# Patient Record
Sex: MALE | Race: WHITE | NOT HISPANIC OR LATINO | URBAN - METROPOLITAN AREA
[De-identification: names, ages, dates, MRNs, and addresses within clinical notes are randomized per-mention and may not be internally consistent; named-entity substitution may affect disease eponyms.]

---

## 2019-11-01 ENCOUNTER — OUTPATIENT (OUTPATIENT)
Dept: OUTPATIENT SERVICES | Facility: HOSPITAL | Age: 74
LOS: 1 days | End: 2019-11-01
Payer: MEDICARE

## 2019-11-01 PROCEDURE — G9001: CPT

## 2019-11-12 ENCOUNTER — EMERGENCY (EMERGENCY)
Facility: HOSPITAL | Age: 74
LOS: 1 days | Discharge: HOME | End: 2019-11-12
Attending: EMERGENCY MEDICINE
Payer: MEDICARE

## 2019-11-12 VITALS
TEMPERATURE: 98 F | RESPIRATION RATE: 18 BRPM | HEIGHT: 67 IN | SYSTOLIC BLOOD PRESSURE: 161 MMHG | DIASTOLIC BLOOD PRESSURE: 81 MMHG | WEIGHT: 235.01 LBS | HEART RATE: 90 BPM | OXYGEN SATURATION: 98 %

## 2019-11-12 VITALS
DIASTOLIC BLOOD PRESSURE: 82 MMHG | OXYGEN SATURATION: 100 % | SYSTOLIC BLOOD PRESSURE: 147 MMHG | HEART RATE: 71 BPM | TEMPERATURE: 98 F | RESPIRATION RATE: 17 BRPM

## 2019-11-12 PROCEDURE — 99284 EMERGENCY DEPT VISIT MOD MDM: CPT | Mod: 25

## 2019-11-12 PROCEDURE — 70450 CT HEAD/BRAIN W/O DYE: CPT | Mod: 26

## 2019-11-12 PROCEDURE — 70450 CT HEAD/BRAIN W/O DYE: CPT

## 2019-11-12 PROCEDURE — 90471 IMMUNIZATION ADMIN: CPT

## 2019-11-12 PROCEDURE — 99284 EMERGENCY DEPT VISIT MOD MDM: CPT

## 2019-11-12 PROCEDURE — 90715 TDAP VACCINE 7 YRS/> IM: CPT

## 2019-11-12 PROCEDURE — 70486 CT MAXILLOFACIAL W/O DYE: CPT

## 2019-11-12 PROCEDURE — 70486 CT MAXILLOFACIAL W/O DYE: CPT | Mod: 26

## 2019-11-12 RX ORDER — TETANUS TOXOID, REDUCED DIPHTHERIA TOXOID AND ACELLULAR PERTUSSIS VACCINE, ADSORBED 5; 2.5; 8; 8; 2.5 [IU]/.5ML; [IU]/.5ML; UG/.5ML; UG/.5ML; UG/.5ML
0.5 SUSPENSION INTRAMUSCULAR ONCE
Refills: 0 | Status: COMPLETED | OUTPATIENT
Start: 2019-11-12 | End: 2019-11-12

## 2019-11-12 RX ADMIN — TETANUS TOXOID, REDUCED DIPHTHERIA TOXOID AND ACELLULAR PERTUSSIS VACCINE, ADSORBED 0.5 MILLILITER(S): 5; 2.5; 8; 8; 2.5 SUSPENSION INTRAMUSCULAR at 16:13

## 2019-11-12 NOTE — ED PROVIDER NOTE - PROGRESS NOTE DETAILS
CT shows scalp hematoma and nasal bone fracture. No septal hematoma. Patient well appearing. Denies any symptoms.. Discussed results and return precautions. Will need to follow up with E.N.T in 1 week. Will need to follow up with PMD in 1-2 days. Pt is well appearing walking with steady gait, stable for discharge and follow up without fail with medical doctor. I had a detailed discussion with the patient and/or guardian regarding the historical points, exam findings, and any diagnostic results supporting the discharge diagnosis. Pt educated on care and need for follow up. Strict return instructions and red flag signs and symptoms discussed with patient. Questions answered. Pt shows understanding of discharge information and agrees to follow.

## 2019-11-12 NOTE — ED PROVIDER NOTE - PHYSICAL EXAMINATION
Abrasion to the mid forearm  Diffused edema to the base of nose with a superficial laceration  No crepitus  Mild tenderness to the base of the nose  Nasal septum midline  No septal hematoma  Dried blood in b/l nostrils  No other facial or lynette tenderness  No C-spine tenderness  Mild abrasion to the left knee with FROM

## 2019-11-12 NOTE — ED PROVIDER NOTE - CARE PLAN
Principal Discharge DX:	Closed fracture of nasal bone, initial encounter  Secondary Diagnosis:	Injury of head, initial encounter  Secondary Diagnosis:	Abrasion

## 2019-11-12 NOTE — ED PROVIDER NOTE - CLINICAL SUMMARY MEDICAL DECISION MAKING FREE TEXT BOX
74 with mechanical fall and abrasion to the forehead and nose. No other injuries. Obtain CT head, maxillofacial, update tetanus, likely discharge if workup negative.

## 2019-11-12 NOTE — ED PROVIDER NOTE - PATIENT PORTAL LINK FT
You can access the FollowMyHealth Patient Portal offered by [facility] by registering at the following website: [email or web address]. By joining [facility]s FollowMyHealth portal, you will also be able to view your health information using other applications (apps) compatible with our system.

## 2019-11-12 NOTE — ED PROVIDER NOTE - OBJECTIVE STATEMENT
75 y/o M patient with a significant PMHx of Hypercholesterolemia, HTN, Prostate CA, and a significant PSHx of prostatectomy presents to the ED s/p fall. Patient states it felt very hot in his house from the heater and he thinks he tripped and hit his face. Patient denies LOC, chest pain, SOB, and any other complaints. Patient denies any pain or injury. NKDA.

## 2019-11-12 NOTE — ED ADULT NURSE NOTE - CADM POA URETHRAL CATHETER
Home care instructions  Apply ice pack to the injection site for 20 minutes periods for the first 24 hrs for soreness/discomfort at injection site DO NOT USE HEAT FOR 24 HOURS  Keep site clean and dry for 24 hours, remove bandaid when desired  Do not drive until tomorrow  Take care when walking after a lumbar injection  Avoid strenuous activities for 2 days  Make take 2 weeks to feel the full effects   Resume home medication as prescribed today  Resume Aspirin, Plavix, or Coumadin the day after the procedure unless otherwise instructed.    SEE IMMEDIATE MEDICAL HELP FOR:  Severe increase in your usual pain or appearance of new pain  Prolonged or increasing weakness or numbness in the legs or arms  Drainage, redness, active bleeding, or increased swelling at the injection site  Temperature over 100.0 degrees F.  Headache that increases when your head is upright and decreases when you lie flat    CALL 911 OR GO DIRECTLY TO EMERGENCY DEPARTMENT FOR:  Shortness of breath, chest pain, or problems breathing  
No

## 2019-11-12 NOTE — ED ADULT NURSE NOTE - OBJECTIVE STATEMENT
Pt. c/o superficial laceration to the forehead and nose s/p fall. Pt. stated he felt hot in the house because of the heat and "I think I tripped and fell and hit my face".

## 2019-11-18 DIAGNOSIS — Z71.89 OTHER SPECIFIED COUNSELING: ICD-10-CM

## 2020-08-18 NOTE — ED PROVIDER NOTE - SCRIBE NAME
ORTHOPAEDICS DAILY PROGRESS NOTE:       SUBJECTIVE/ROS: No acute events overnight. Patient seen and examined this AM at the bedside. Patient feels well. Denies nausea, vomiting, chest pain, shortness of breath         MEDICATIONS  (STANDING):  aspirin enteric coated 325 milliGRAM(s) Oral two times a day  carbidopa/levodopa  25/100 1 Tablet(s) Oral <User Schedule>  escitalopram 10 milliGRAM(s) Oral daily  polyethylene glycol 3350 17 Gram(s) Oral daily  senna 2 Tablet(s) Oral at bedtime    MEDICATIONS  (PRN):  ondansetron Injectable 4 milliGRAM(s) IV Push every 6 hours PRN Nausea and/or Vomiting  oxyCODONE    IR 2.5 milliGRAM(s) Oral every 4 hours PRN Moderate Pain (4 - 6)  oxyCODONE    IR 5 milliGRAM(s) Oral every 4 hours PRN Severe Pain (7 - 10)      OBJECTIVE:    Vital Signs Last 24 Hrs  T(C): 36.6 (18 Aug 2020 04:40), Max: 36.7 (17 Aug 2020 21:27)  T(F): 97.8 (18 Aug 2020 04:40), Max: 98.1 (17 Aug 2020 21:27)  HR: 76 (18 Aug 2020 04:40) (71 - 86)  BP: 129/73 (18 Aug 2020 04:40) (93/55 - 129/73)  BP(mean): --  RR: 18 (18 Aug 2020 04:40) (18 - 18)  SpO2: 92% (18 Aug 2020 04:40) (92% - 94%)    I&O's Detail    17 Aug 2020 07:01  -  18 Aug 2020 07:00  --------------------------------------------------------  IN:    Oral Fluid: 1910 mL  Total IN: 1910 mL    OUT:    Voided: 1900 mL  Total OUT: 1900 mL    Total NET: 10 mL          Daily     Daily     LABS:                        10.2   7.41  )-----------( 184      ( 18 Aug 2020 06:22 )             32.1     08-18    142  |  104  |  18  ----------------------------<  95  4.4   |  29  |  0.71    Ca    8.6      18 Aug 2020 06:22                    PHYSICAL EXAM:  Constitutional: well developed, well nourished, NAD  Eyes: anicteric  ENMT: normal facies, symmetric  Neck: supple  Respiratory: nonlabored resp  MSK:  RLE:  dressing clean, dry, intact, aquacell  SILT S/S/DP/SP/T  +EHL/FHL/TA/GSC  Compartments soft/non-tender  Toes warm, Cap refill brisk/warm and perfused, +DP/PT pulse     Orlando Wells MD Melita Castillo (Scribe)

## 2021-11-05 ENCOUNTER — TRANSCRIPTION ENCOUNTER (OUTPATIENT)
Age: 76
End: 2021-11-05

## 2024-02-04 ENCOUNTER — INPATIENT (INPATIENT)
Facility: HOSPITAL | Age: 79
LOS: 2 days | Discharge: HOME | DRG: 871 | End: 2024-02-07
Attending: INTERNAL MEDICINE | Admitting: INTERNAL MEDICINE
Payer: MEDICARE

## 2024-02-04 VITALS
TEMPERATURE: 97 F | OXYGEN SATURATION: 91 % | RESPIRATION RATE: 20 BRPM | WEIGHT: 220.24 LBS | DIASTOLIC BLOOD PRESSURE: 61 MMHG | SYSTOLIC BLOOD PRESSURE: 120 MMHG | HEIGHT: 68 IN | HEART RATE: 101 BPM

## 2024-02-04 DIAGNOSIS — A41.9 SEPSIS, UNSPECIFIED ORGANISM: ICD-10-CM

## 2024-02-04 DIAGNOSIS — Z29.9 ENCOUNTER FOR PROPHYLACTIC MEASURES, UNSPECIFIED: ICD-10-CM

## 2024-02-04 DIAGNOSIS — E11.9 TYPE 2 DIABETES MELLITUS WITHOUT COMPLICATIONS: ICD-10-CM

## 2024-02-04 DIAGNOSIS — J18.9 PNEUMONIA, UNSPECIFIED ORGANISM: ICD-10-CM

## 2024-02-04 DIAGNOSIS — J10.1 INFLUENZA DUE TO OTHER IDENTIFIED INFLUENZA VIRUS WITH OTHER RESPIRATORY MANIFESTATIONS: ICD-10-CM

## 2024-02-04 DIAGNOSIS — J96.01 ACUTE RESPIRATORY FAILURE WITH HYPOXIA: ICD-10-CM

## 2024-02-04 DIAGNOSIS — E78.5 HYPERLIPIDEMIA, UNSPECIFIED: ICD-10-CM

## 2024-02-04 DIAGNOSIS — I10 ESSENTIAL (PRIMARY) HYPERTENSION: ICD-10-CM

## 2024-02-04 LAB
ALBUMIN SERPL ELPH-MCNC: 3.6 G/DL — SIGNIFICANT CHANGE UP (ref 3.5–5)
ALP SERPL-CCNC: 56 U/L — SIGNIFICANT CHANGE UP (ref 40–120)
ALT FLD-CCNC: 26 U/L DA — SIGNIFICANT CHANGE UP (ref 10–60)
ANION GAP SERPL CALC-SCNC: 9 MMOL/L — SIGNIFICANT CHANGE UP (ref 5–17)
APPEARANCE UR: CLEAR — SIGNIFICANT CHANGE UP
APTT BLD: 35.4 SEC — SIGNIFICANT CHANGE UP (ref 24.5–35.6)
AST SERPL-CCNC: 22 U/L — SIGNIFICANT CHANGE UP (ref 10–40)
BASOPHILS # BLD AUTO: 0 K/UL — SIGNIFICANT CHANGE UP (ref 0–0.2)
BASOPHILS NFR BLD AUTO: 0 % — SIGNIFICANT CHANGE UP (ref 0–2)
BILIRUB SERPL-MCNC: 1.2 MG/DL — SIGNIFICANT CHANGE UP (ref 0.2–1.2)
BILIRUB UR-MCNC: NEGATIVE — SIGNIFICANT CHANGE UP
BUN SERPL-MCNC: 17 MG/DL — SIGNIFICANT CHANGE UP (ref 7–18)
CALCIUM SERPL-MCNC: 8.4 MG/DL — SIGNIFICANT CHANGE UP (ref 8.4–10.5)
CHLORIDE SERPL-SCNC: 97 MMOL/L — SIGNIFICANT CHANGE UP (ref 96–108)
CHOLEST SERPL-MCNC: 99 MG/DL — SIGNIFICANT CHANGE UP
CO2 SERPL-SCNC: 28 MMOL/L — SIGNIFICANT CHANGE UP (ref 22–31)
COLOR SPEC: YELLOW — SIGNIFICANT CHANGE UP
CREAT SERPL-MCNC: 1.17 MG/DL — SIGNIFICANT CHANGE UP (ref 0.5–1.3)
DIFF PNL FLD: ABNORMAL
EGFR: 63 ML/MIN/1.73M2 — SIGNIFICANT CHANGE UP
EOSINOPHIL # BLD AUTO: 0 K/UL — SIGNIFICANT CHANGE UP (ref 0–0.5)
EOSINOPHIL NFR BLD AUTO: 0 % — SIGNIFICANT CHANGE UP (ref 0–6)
ETHANOL SERPL-MCNC: 5 MG/DL — SIGNIFICANT CHANGE UP (ref 0–10)
FLUAV AG NPH QL: DETECTED
FLUBV AG NPH QL: SIGNIFICANT CHANGE UP
GAS PNL BLDA: SIGNIFICANT CHANGE UP
GLUCOSE BLDC GLUCOMTR-MCNC: 126 MG/DL — HIGH (ref 70–99)
GLUCOSE BLDC GLUCOMTR-MCNC: 127 MG/DL — HIGH (ref 70–99)
GLUCOSE SERPL-MCNC: 151 MG/DL — HIGH (ref 70–99)
GLUCOSE UR QL: NEGATIVE MG/DL — SIGNIFICANT CHANGE UP
HCT VFR BLD CALC: 40.6 % — SIGNIFICANT CHANGE UP (ref 39–50)
HDLC SERPL-MCNC: 46 MG/DL — SIGNIFICANT CHANGE UP
HGB BLD-MCNC: 13.8 G/DL — SIGNIFICANT CHANGE UP (ref 13–17)
INR BLD: 1.21 RATIO — HIGH (ref 0.85–1.18)
KETONES UR-MCNC: ABNORMAL MG/DL
LACTATE SERPL-SCNC: 2 MMOL/L — SIGNIFICANT CHANGE UP (ref 0.7–2)
LACTATE SERPL-SCNC: 2.6 MMOL/L — HIGH (ref 0.7–2)
LEUKOCYTE ESTERASE UR-ACNC: NEGATIVE — SIGNIFICANT CHANGE UP
LIDOCAIN IGE QN: 12 U/L — LOW (ref 13–75)
LIPID PNL WITH DIRECT LDL SERPL: 36 MG/DL — SIGNIFICANT CHANGE UP
LYMPHOCYTES # BLD AUTO: 1.27 K/UL — SIGNIFICANT CHANGE UP (ref 1–3.3)
LYMPHOCYTES # BLD AUTO: 10 % — LOW (ref 13–44)
MAGNESIUM SERPL-MCNC: 1.4 MG/DL — LOW (ref 1.6–2.6)
MCHC RBC-ENTMCNC: 29.9 PG — SIGNIFICANT CHANGE UP (ref 27–34)
MCHC RBC-ENTMCNC: 34 GM/DL — SIGNIFICANT CHANGE UP (ref 32–36)
MCV RBC AUTO: 88.1 FL — SIGNIFICANT CHANGE UP (ref 80–100)
MONOCYTES # BLD AUTO: 0.51 K/UL — SIGNIFICANT CHANGE UP (ref 0–0.9)
MONOCYTES NFR BLD AUTO: 4 % — SIGNIFICANT CHANGE UP (ref 2–14)
NEUTROPHILS # BLD AUTO: 10.02 K/UL — HIGH (ref 1.8–7.4)
NEUTROPHILS NFR BLD AUTO: 78 % — HIGH (ref 43–77)
NITRITE UR-MCNC: NEGATIVE — SIGNIFICANT CHANGE UP
NON HDL CHOLESTEROL: 53 MG/DL — SIGNIFICANT CHANGE UP
PH UR: 6.5 — SIGNIFICANT CHANGE UP (ref 5–8)
PLATELET # BLD AUTO: 145 K/UL — LOW (ref 150–400)
POTASSIUM SERPL-MCNC: 3.3 MMOL/L — LOW (ref 3.5–5.3)
POTASSIUM SERPL-SCNC: 3.3 MMOL/L — LOW (ref 3.5–5.3)
PROT SERPL-MCNC: 7.6 G/DL — SIGNIFICANT CHANGE UP (ref 6–8.3)
PROT UR-MCNC: 100 MG/DL
PROTHROM AB SERPL-ACNC: 13.7 SEC — HIGH (ref 9.5–13)
RBC # BLD: 4.61 M/UL — SIGNIFICANT CHANGE UP (ref 4.2–5.8)
RBC # FLD: 12.7 % — SIGNIFICANT CHANGE UP (ref 10.3–14.5)
SARS-COV-2 RNA SPEC QL NAA+PROBE: SIGNIFICANT CHANGE UP
SODIUM SERPL-SCNC: 134 MMOL/L — LOW (ref 135–145)
SP GR SPEC: 1.02 — SIGNIFICANT CHANGE UP (ref 1–1.03)
TRIGL SERPL-MCNC: 86 MG/DL — SIGNIFICANT CHANGE UP
TROPONIN I, HIGH SENSITIVITY RESULT: 20.4 NG/L — SIGNIFICANT CHANGE UP
UROBILINOGEN FLD QL: 1 MG/DL — SIGNIFICANT CHANGE UP (ref 0.2–1)
WBC # BLD: 12.68 K/UL — HIGH (ref 3.8–10.5)
WBC # FLD AUTO: 12.68 K/UL — HIGH (ref 3.8–10.5)

## 2024-02-04 PROCEDURE — 71045 X-RAY EXAM CHEST 1 VIEW: CPT | Mod: 26

## 2024-02-04 PROCEDURE — 99285 EMERGENCY DEPT VISIT HI MDM: CPT

## 2024-02-04 PROCEDURE — 71275 CT ANGIOGRAPHY CHEST: CPT | Mod: 26

## 2024-02-04 RX ORDER — KETOROLAC TROMETHAMINE 30 MG/ML
30 SYRINGE (ML) INJECTION ONCE
Refills: 0 | Status: DISCONTINUED | OUTPATIENT
Start: 2024-02-04 | End: 2024-02-04

## 2024-02-04 RX ORDER — PANTOPRAZOLE SODIUM 20 MG/1
40 TABLET, DELAYED RELEASE ORAL ONCE
Refills: 0 | Status: COMPLETED | OUTPATIENT
Start: 2024-02-04 | End: 2024-02-04

## 2024-02-04 RX ORDER — SODIUM CHLORIDE 9 MG/ML
1000 INJECTION INTRAMUSCULAR; INTRAVENOUS; SUBCUTANEOUS ONCE
Refills: 0 | Status: COMPLETED | OUTPATIENT
Start: 2024-02-04 | End: 2024-02-04

## 2024-02-04 RX ORDER — ACETAMINOPHEN 500 MG
650 TABLET ORAL EVERY 6 HOURS
Refills: 0 | Status: DISCONTINUED | OUTPATIENT
Start: 2024-02-04 | End: 2024-02-07

## 2024-02-04 RX ORDER — SODIUM CHLORIDE 9 MG/ML
1000 INJECTION, SOLUTION INTRAVENOUS ONCE
Refills: 0 | Status: COMPLETED | OUTPATIENT
Start: 2024-02-04 | End: 2024-02-04

## 2024-02-04 RX ORDER — CEFTRIAXONE 500 MG/1
1000 INJECTION, POWDER, FOR SOLUTION INTRAMUSCULAR; INTRAVENOUS ONCE
Refills: 0 | Status: COMPLETED | OUTPATIENT
Start: 2024-02-04 | End: 2024-02-04

## 2024-02-04 RX ORDER — CARVEDILOL PHOSPHATE 80 MG/1
1 CAPSULE, EXTENDED RELEASE ORAL
Refills: 0 | DISCHARGE

## 2024-02-04 RX ORDER — ESOMEPRAZOLE MAGNESIUM 40 MG/1
1 CAPSULE, DELAYED RELEASE ORAL
Refills: 0 | DISCHARGE

## 2024-02-04 RX ORDER — GLUCAGON INJECTION, SOLUTION 0.5 MG/.1ML
1 INJECTION, SOLUTION SUBCUTANEOUS ONCE
Refills: 0 | Status: DISCONTINUED | OUTPATIENT
Start: 2024-02-04 | End: 2024-02-07

## 2024-02-04 RX ORDER — ATORVASTATIN CALCIUM 80 MG/1
1 TABLET, FILM COATED ORAL
Refills: 0 | DISCHARGE

## 2024-02-04 RX ORDER — ONDANSETRON 8 MG/1
4 TABLET, FILM COATED ORAL ONCE
Refills: 0 | Status: COMPLETED | OUTPATIENT
Start: 2024-02-04 | End: 2024-02-04

## 2024-02-04 RX ORDER — AMLODIPINE BESYLATE 2.5 MG/1
1 TABLET ORAL
Refills: 0 | DISCHARGE

## 2024-02-04 RX ORDER — AZITHROMYCIN 500 MG/1
500 TABLET, FILM COATED ORAL EVERY 24 HOURS
Refills: 0 | Status: DISCONTINUED | OUTPATIENT
Start: 2024-02-04 | End: 2024-02-07

## 2024-02-04 RX ORDER — DEXTROSE 50 % IN WATER 50 %
25 SYRINGE (ML) INTRAVENOUS ONCE
Refills: 0 | Status: DISCONTINUED | OUTPATIENT
Start: 2024-02-04 | End: 2024-02-07

## 2024-02-04 RX ORDER — ATORVASTATIN CALCIUM 80 MG/1
40 TABLET, FILM COATED ORAL AT BEDTIME
Refills: 0 | Status: DISCONTINUED | OUTPATIENT
Start: 2024-02-04 | End: 2024-02-07

## 2024-02-04 RX ORDER — SODIUM CHLORIDE 9 MG/ML
1000 INJECTION, SOLUTION INTRAVENOUS
Refills: 0 | Status: DISCONTINUED | OUTPATIENT
Start: 2024-02-04 | End: 2024-02-07

## 2024-02-04 RX ORDER — ONDANSETRON 8 MG/1
4 TABLET, FILM COATED ORAL EVERY 8 HOURS
Refills: 0 | Status: DISCONTINUED | OUTPATIENT
Start: 2024-02-04 | End: 2024-02-07

## 2024-02-04 RX ORDER — LOSARTAN/HYDROCHLOROTHIAZIDE 100MG-25MG
1 TABLET ORAL
Refills: 0 | DISCHARGE

## 2024-02-04 RX ORDER — METFORMIN HYDROCHLORIDE 850 MG/1
1 TABLET ORAL
Refills: 0 | DISCHARGE

## 2024-02-04 RX ORDER — ASPIRIN/CALCIUM CARB/MAGNESIUM 324 MG
81 TABLET ORAL DAILY
Refills: 0 | Status: DISCONTINUED | OUTPATIENT
Start: 2024-02-05 | End: 2024-02-07

## 2024-02-04 RX ORDER — CEFTRIAXONE 500 MG/1
1000 INJECTION, POWDER, FOR SOLUTION INTRAMUSCULAR; INTRAVENOUS EVERY 24 HOURS
Refills: 0 | Status: DISCONTINUED | OUTPATIENT
Start: 2024-02-04 | End: 2024-02-07

## 2024-02-04 RX ORDER — DEXTROSE 50 % IN WATER 50 %
12.5 SYRINGE (ML) INTRAVENOUS ONCE
Refills: 0 | Status: DISCONTINUED | OUTPATIENT
Start: 2024-02-04 | End: 2024-02-07

## 2024-02-04 RX ORDER — DEXTROSE 50 % IN WATER 50 %
15 SYRINGE (ML) INTRAVENOUS ONCE
Refills: 0 | Status: DISCONTINUED | OUTPATIENT
Start: 2024-02-04 | End: 2024-02-07

## 2024-02-04 RX ORDER — AZITHROMYCIN 500 MG/1
500 TABLET, FILM COATED ORAL ONCE
Refills: 0 | Status: COMPLETED | OUTPATIENT
Start: 2024-02-04 | End: 2024-02-04

## 2024-02-04 RX ORDER — DULAGLUTIDE 4.5 MG/.5ML
1.5 INJECTION, SOLUTION SUBCUTANEOUS
Refills: 0 | DISCHARGE

## 2024-02-04 RX ORDER — ENOXAPARIN SODIUM 100 MG/ML
40 INJECTION SUBCUTANEOUS EVERY 24 HOURS
Refills: 0 | Status: DISCONTINUED | OUTPATIENT
Start: 2024-02-04 | End: 2024-02-07

## 2024-02-04 RX ORDER — INSULIN LISPRO 100/ML
VIAL (ML) SUBCUTANEOUS
Refills: 0 | Status: DISCONTINUED | OUTPATIENT
Start: 2024-02-04 | End: 2024-02-07

## 2024-02-04 RX ORDER — POTASSIUM CHLORIDE 20 MEQ
40 PACKET (EA) ORAL ONCE
Refills: 0 | Status: COMPLETED | OUTPATIENT
Start: 2024-02-04 | End: 2024-02-04

## 2024-02-04 RX ADMIN — PANTOPRAZOLE SODIUM 40 MILLIGRAM(S): 20 TABLET, DELAYED RELEASE ORAL at 09:42

## 2024-02-04 RX ADMIN — AZITHROMYCIN 255 MILLIGRAM(S): 500 TABLET, FILM COATED ORAL at 11:16

## 2024-02-04 RX ADMIN — SODIUM CHLORIDE 1000 MILLILITER(S): 9 INJECTION, SOLUTION INTRAVENOUS at 13:37

## 2024-02-04 RX ADMIN — Medication 75 MILLIGRAM(S): at 12:51

## 2024-02-04 RX ADMIN — SODIUM CHLORIDE 75 MILLILITER(S): 9 INJECTION, SOLUTION INTRAVENOUS at 14:10

## 2024-02-04 RX ADMIN — SODIUM CHLORIDE 1000 MILLILITER(S): 9 INJECTION INTRAMUSCULAR; INTRAVENOUS; SUBCUTANEOUS at 09:42

## 2024-02-04 RX ADMIN — Medication 30 MILLIGRAM(S): at 12:04

## 2024-02-04 RX ADMIN — Medication 30 MILLIGRAM(S): at 09:42

## 2024-02-04 RX ADMIN — CEFTRIAXONE 100 MILLIGRAM(S): 500 INJECTION, POWDER, FOR SOLUTION INTRAMUSCULAR; INTRAVENOUS at 11:00

## 2024-02-04 RX ADMIN — ONDANSETRON 4 MILLIGRAM(S): 8 TABLET, FILM COATED ORAL at 16:26

## 2024-02-04 RX ADMIN — Medication 40 MILLIEQUIVALENT(S): at 12:50

## 2024-02-04 RX ADMIN — ENOXAPARIN SODIUM 40 MILLIGRAM(S): 100 INJECTION SUBCUTANEOUS at 12:50

## 2024-02-04 RX ADMIN — ONDANSETRON 4 MILLIGRAM(S): 8 TABLET, FILM COATED ORAL at 09:42

## 2024-02-04 RX ADMIN — ATORVASTATIN CALCIUM 40 MILLIGRAM(S): 80 TABLET, FILM COATED ORAL at 21:46

## 2024-02-04 NOTE — ED ADULT NURSE NOTE - OBJECTIVE STATEMENT
Patient came to the ED a/o x 3 ambulates c/o generalized abdominal pain with vomiting x 1 week, slight SOB/ dizziness noted.

## 2024-02-04 NOTE — H&P ADULT - NSHPPHYSICALEXAM_GEN_ALL_CORE
PHYSICAL EXAMINATION:  GENERAL: NAD. lying comfortable in bed  HEAD:  Atraumatic, Normocephalic  EYES:  Conjunctiva and sclera clear, pupils are equal, round, and reactive to light and accommodation.  NECK: Supple, No JVD, trachea is midline, no evidence of thyroid enlargement, no lymphadenopathy or tenderness.  CHEST/LUNG: Crackles noted on the right lower zone, no wheezing, or rubs  HEART: Regular rate and rhythm; grade I/VI systolic murmur noted on the left lower sternal border, no rubs, or gallops  ABDOMEN: Soft, Nontender, Nondistended; Bowel sounds present  NERVOUS SYSTEM:  Alert & Oriented X3; No focal sensory or motor deficits are noted; Recent and remote memory is intact; Appropriate mood and affect.  EXTREMITIES:  2+ Peripheral Pulses, No clubbing, cyanosis, or edema. Varicose veins on lower extremity bilaterally   SKIN: Warm, dry, and well perfused; Good turgor; No lesions, nodules or rashes are noted.     Vital Signs Last 24 Hrs  T(C): 37.2 (04 Feb 2024 12:35), Max: 37.2 (04 Feb 2024 12:35)  T(F): 98.9 (04 Feb 2024 12:35), Max: 98.9 (04 Feb 2024 12:35)  HR: 81 (04 Feb 2024 12:35) (81 - 101)  BP: 102/60 (04 Feb 2024 12:35) (102/60 - 120/61)  BP(mean): --  RR: 19 (04 Feb 2024 12:35) (19 - 20)  SpO2: 94% (04 Feb 2024 12:35) (91% - 94%)    Parameters below as of 04 Feb 2024 12:35  Patient On (Oxygen Delivery Method): nasal cannula  O2 Flow (L/min): 2

## 2024-02-04 NOTE — ED PROVIDER NOTE - PROGRESS NOTE DETAILS
labs with elevated lactate and WBC. cxr with RLL PNA. fluA+. given abx/fluids. will admit for IV abx.

## 2024-02-04 NOTE — H&P ADULT - HISTORY OF PRESENT ILLNESS
A 78 year old male, from home, with PMHx of HTN, DM, HLD, and Prostate Ca s/p resection presented to the ED complaining of dyspnea that started over 24 to 48 hours ago. This is associated with productive cough, pleuritic chest pain, fever, chills, and malaise. Patient has been having nausea, non bloody emesis, and headache for about a week. Denies abdominal pain, diarrhea, night sweats, weight loss, dizziness, or syncope. Denies orthopnea or leg swelling. No recent sick contact or travel. He does not have any other concerns.

## 2024-02-04 NOTE — H&P ADULT - PROBLEM SELECTOR PLAN 1
s/p 1L Bolus NS  1L Bolus LR   LR 75 cc for 24 hours   Lactate 2.6>2.0  Influenza positive   CXR noted  F/U Blood and Sputum cultures  Will start Ceftriaxone 1g IV and Azithromycin 500 mg IV   F/U Strep  F/U Mycoplasma  F/U Legionella  Tylenol PRN

## 2024-02-04 NOTE — PATIENT PROFILE ADULT - FALL HARM RISK - HARM RISK INTERVENTIONS

## 2024-02-04 NOTE — ED PROVIDER NOTE - OBJECTIVE STATEMENT
78 year old male PMH HLD, HTN, hx prostate cancer coming in with 1 week of persistent nausea, mild upper abd pain, and 2 days of mild SOB. pt states symptoms started after he was drinking at dinner and drank more than he usually would. states unable to tolerate PO 2/2 N/V. denies all other complaints.

## 2024-02-04 NOTE — H&P ADULT - PROBLEM SELECTOR PLAN 7
Will continue high intensity statin.   F/U Lipid panel  DASH/TLC diet  Patient needs education for lifestyle modifications  Limiting saturated fat and trans fat intake and increasing fresh fruit and vegetable intake are recommended  Encourage 30-60 minutes of moderate-intensity aerobic activity (brisk walking) on most days and resistance training 2 days/week  Weight management recommended with goals of body mass index (BMI) 18.5-24.9 kg/m2 and waist circumference < 40 inches (102 cm) for men, < 35 inches (89 cm) for women

## 2024-02-04 NOTE — H&P ADULT - PROBLEM SELECTOR PLAN 6
Patient septic   Will monitor for now  Morning team to restart home medications once sepsis resolves   Currently at goal   BP target <130/90 mmHg  DASH/TLC diet  Adjust medications as needed to meet target.

## 2024-02-04 NOTE — ED ADULT NURSE NOTE - SUICIDE SCREENING QUESTION 3
----- Message from Chikis Ames MD sent at 10/14/2021  8:18 AM CDT -----  Please notify mother that covid is negative.   No

## 2024-02-04 NOTE — H&P ADULT - ASSESSMENT
A 78 year old male, from home, with PMHx of HTN, DM, HLD, and Prostate Ca s/p resection presented to the ED complaining of dyspnea associated with productive cough, pleuritic chest pain, fever, chills, and malaisethat started over 24 to 48 hours ago. Admitted to medicine for sepsis and AHRF likely secondary to PNA and/or Influenza A.

## 2024-02-04 NOTE — H&P ADULT - PROBLEM SELECTOR PLAN 2
CXRnoted  PSI/PORT Score 109 points Risk Class IV  ABG noted  Will start Ceftriaxone 1g IV and Azithromycin 500 mg IV   Guaifenesin PRN for cough  F/U Strep  F/U Mycoplasma  F/U Legionella  F/U Sputum culture   Tylenol PRN  Wean oxygen as tolerated   Incentive spirometer

## 2024-02-04 NOTE — H&P ADULT - PROBLEM SELECTOR PLAN 4
Positive for Influenza A  Isolation precautions.  Tamiflu 75 mg twice daily for 5 days   Guaifenesin PRN for cough  Tylenol PRN

## 2024-02-04 NOTE — ED ADULT TRIAGE NOTE - CHIEF COMPLAINT QUOTE
Patient c/o generalized abdominal pain with nausea, vomiting, and dizziness x 1 week. Patient reports shortness of breath x yesterday.

## 2024-02-04 NOTE — ED ADULT NURSE NOTE - NSFALLUNIVINTERV_ED_ALL_ED
Bed/Stretcher in lowest position, wheels locked, appropriate side rails in place/Call bell, personal items and telephone in reach/Instruct patient to call for assistance before getting out of bed/chair/stretcher/Non-slip footwear applied when patient is off stretcher/[place] to call system/Physically safe environment - no spills, clutter or unnecessary equipment/Purposeful proactive rounding/Room/bathroom lighting operational, light cord in reach

## 2024-02-04 NOTE — H&P ADULT - ATTENDING COMMENTS
Seen and examined . Said I feel better . Admitted with Sepsis with Influenza A with Pneumonia with Hypoxia so started and Tamiflu and  IV Abxs. Hemodynamically stable .NAINA Nunez consulted.

## 2024-02-04 NOTE — H&P ADULT - NSICDXPASTMEDICALHX_GEN_ALL_CORE_FT
05-Jul-2021 20:01
PAST MEDICAL HISTORY:  Hypercholesterolemia     Hypertension     Prostate cancer

## 2024-02-04 NOTE — ED PROVIDER NOTE - CLINICAL SUMMARY MEDICAL DECISION MAKING FREE TEXT BOX
78 year old male with nausea, abd pain, SOB. PE as above.  labs, fluids, cxr, US GB, zofran/pain control, reassess

## 2024-02-05 DIAGNOSIS — E87.6 HYPOKALEMIA: ICD-10-CM

## 2024-02-05 LAB
A1C WITH ESTIMATED AVERAGE GLUCOSE RESULT: 5.6 % — SIGNIFICANT CHANGE UP (ref 4–5.6)
ALBUMIN SERPL ELPH-MCNC: 2.7 G/DL — LOW (ref 3.5–5)
ALP SERPL-CCNC: 46 U/L — SIGNIFICANT CHANGE UP (ref 40–120)
ALT FLD-CCNC: 18 U/L DA — SIGNIFICANT CHANGE UP (ref 10–60)
ANION GAP SERPL CALC-SCNC: 7 MMOL/L — SIGNIFICANT CHANGE UP (ref 5–17)
APTT BLD: 32.9 SEC — SIGNIFICANT CHANGE UP (ref 24.5–35.6)
AST SERPL-CCNC: 21 U/L — SIGNIFICANT CHANGE UP (ref 10–40)
BASOPHILS # BLD AUTO: 0.02 K/UL — SIGNIFICANT CHANGE UP (ref 0–0.2)
BASOPHILS NFR BLD AUTO: 0.2 % — SIGNIFICANT CHANGE UP (ref 0–2)
BILIRUB SERPL-MCNC: 1.1 MG/DL — SIGNIFICANT CHANGE UP (ref 0.2–1.2)
BUN SERPL-MCNC: 16 MG/DL — SIGNIFICANT CHANGE UP (ref 7–18)
CALCIUM SERPL-MCNC: 8.1 MG/DL — LOW (ref 8.4–10.5)
CHLORIDE SERPL-SCNC: 100 MMOL/L — SIGNIFICANT CHANGE UP (ref 96–108)
CHOLEST SERPL-MCNC: 84 MG/DL — SIGNIFICANT CHANGE UP
CO2 SERPL-SCNC: 29 MMOL/L — SIGNIFICANT CHANGE UP (ref 22–31)
CREAT SERPL-MCNC: 0.95 MG/DL — SIGNIFICANT CHANGE UP (ref 0.5–1.3)
CULTURE RESULTS: SIGNIFICANT CHANGE UP
EGFR: 81 ML/MIN/1.73M2 — SIGNIFICANT CHANGE UP
EOSINOPHIL # BLD AUTO: 0 K/UL — SIGNIFICANT CHANGE UP (ref 0–0.5)
EOSINOPHIL NFR BLD AUTO: 0 % — SIGNIFICANT CHANGE UP (ref 0–6)
ESTIMATED AVERAGE GLUCOSE: 114 MG/DL — SIGNIFICANT CHANGE UP (ref 68–114)
GLUCOSE BLDC GLUCOMTR-MCNC: 112 MG/DL — HIGH (ref 70–99)
GLUCOSE BLDC GLUCOMTR-MCNC: 135 MG/DL — HIGH (ref 70–99)
GLUCOSE BLDC GLUCOMTR-MCNC: 92 MG/DL — SIGNIFICANT CHANGE UP (ref 70–99)
GLUCOSE BLDC GLUCOMTR-MCNC: 97 MG/DL — SIGNIFICANT CHANGE UP (ref 70–99)
GLUCOSE SERPL-MCNC: 99 MG/DL — SIGNIFICANT CHANGE UP (ref 70–99)
HCT VFR BLD CALC: 32 % — LOW (ref 39–50)
HDLC SERPL-MCNC: 32 MG/DL — LOW
HGB BLD-MCNC: 10.8 G/DL — LOW (ref 13–17)
IMM GRANULOCYTES NFR BLD AUTO: 0.8 % — SIGNIFICANT CHANGE UP (ref 0–0.9)
INR BLD: 1.14 RATIO — SIGNIFICANT CHANGE UP (ref 0.85–1.18)
LEGIONELLA AG UR QL: NEGATIVE — SIGNIFICANT CHANGE UP
LIPID PNL WITH DIRECT LDL SERPL: 35 MG/DL — SIGNIFICANT CHANGE UP
LYMPHOCYTES # BLD AUTO: 0.89 K/UL — LOW (ref 1–3.3)
LYMPHOCYTES # BLD AUTO: 9.1 % — LOW (ref 13–44)
MAGNESIUM SERPL-MCNC: 1.7 MG/DL — SIGNIFICANT CHANGE UP (ref 1.6–2.6)
MCHC RBC-ENTMCNC: 29.7 PG — SIGNIFICANT CHANGE UP (ref 27–34)
MCHC RBC-ENTMCNC: 33.8 GM/DL — SIGNIFICANT CHANGE UP (ref 32–36)
MCV RBC AUTO: 87.9 FL — SIGNIFICANT CHANGE UP (ref 80–100)
MONOCYTES # BLD AUTO: 0.21 K/UL — SIGNIFICANT CHANGE UP (ref 0–0.9)
MONOCYTES NFR BLD AUTO: 2.1 % — SIGNIFICANT CHANGE UP (ref 2–14)
MRSA PCR RESULT.: SIGNIFICANT CHANGE UP
NEUTROPHILS # BLD AUTO: 8.58 K/UL — HIGH (ref 1.8–7.4)
NEUTROPHILS NFR BLD AUTO: 87.8 % — HIGH (ref 43–77)
NON HDL CHOLESTEROL: 52 MG/DL — SIGNIFICANT CHANGE UP
NRBC # BLD: 0 /100 WBCS — SIGNIFICANT CHANGE UP (ref 0–0)
PLATELET # BLD AUTO: 141 K/UL — LOW (ref 150–400)
POTASSIUM SERPL-MCNC: 3.3 MMOL/L — LOW (ref 3.5–5.3)
POTASSIUM SERPL-SCNC: 3.3 MMOL/L — LOW (ref 3.5–5.3)
PROT SERPL-MCNC: 6.4 G/DL — SIGNIFICANT CHANGE UP (ref 6–8.3)
PROTHROM AB SERPL-ACNC: 13 SEC — SIGNIFICANT CHANGE UP (ref 9.5–13)
RBC # BLD: 3.64 M/UL — LOW (ref 4.2–5.8)
RBC # FLD: 13.1 % — SIGNIFICANT CHANGE UP (ref 10.3–14.5)
S AUREUS DNA NOSE QL NAA+PROBE: SIGNIFICANT CHANGE UP
S PNEUM AG UR QL: NEGATIVE — SIGNIFICANT CHANGE UP
SODIUM SERPL-SCNC: 136 MMOL/L — SIGNIFICANT CHANGE UP (ref 135–145)
SPECIMEN SOURCE: SIGNIFICANT CHANGE UP
TRIGL SERPL-MCNC: 87 MG/DL — SIGNIFICANT CHANGE UP
WBC # BLD: 9.78 K/UL — SIGNIFICANT CHANGE UP (ref 3.8–10.5)
WBC # FLD AUTO: 9.78 K/UL — SIGNIFICANT CHANGE UP (ref 3.8–10.5)

## 2024-02-05 RX ORDER — POTASSIUM CHLORIDE 20 MEQ
40 PACKET (EA) ORAL ONCE
Refills: 0 | Status: COMPLETED | OUTPATIENT
Start: 2024-02-05 | End: 2024-02-05

## 2024-02-05 RX ORDER — LANOLIN ALCOHOL/MO/W.PET/CERES
5 CREAM (GRAM) TOPICAL AT BEDTIME
Refills: 0 | Status: DISCONTINUED | OUTPATIENT
Start: 2024-02-05 | End: 2024-02-07

## 2024-02-05 RX ADMIN — Medication 75 MILLIGRAM(S): at 17:28

## 2024-02-05 RX ADMIN — Medication 81 MILLIGRAM(S): at 11:09

## 2024-02-05 RX ADMIN — ATORVASTATIN CALCIUM 40 MILLIGRAM(S): 80 TABLET, FILM COATED ORAL at 21:28

## 2024-02-05 RX ADMIN — AZITHROMYCIN 255 MILLIGRAM(S): 500 TABLET, FILM COATED ORAL at 12:37

## 2024-02-05 RX ADMIN — Medication 40 MILLIEQUIVALENT(S): at 14:57

## 2024-02-05 RX ADMIN — Medication 75 MILLIGRAM(S): at 05:18

## 2024-02-05 RX ADMIN — SODIUM CHLORIDE 75 MILLILITER(S): 9 INJECTION, SOLUTION INTRAVENOUS at 11:09

## 2024-02-05 RX ADMIN — ENOXAPARIN SODIUM 40 MILLIGRAM(S): 100 INJECTION SUBCUTANEOUS at 14:57

## 2024-02-05 RX ADMIN — CEFTRIAXONE 100 MILLIGRAM(S): 500 INJECTION, POWDER, FOR SOLUTION INTRAMUSCULAR; INTRAVENOUS at 11:09

## 2024-02-05 RX ADMIN — Medication 5 MILLIGRAM(S): at 21:28

## 2024-02-05 NOTE — PROGRESS NOTE ADULT - SUBJECTIVE AND OBJECTIVE BOX
NP Note discussed with  Primary Attending    Patient is a 78y old  Male who presents with a chief complaint of Sepsis (05 Feb 2024 09:55)      INTERVAL HPI/OVERNIGHT EVENTS: no new complaints    MEDICATIONS  (STANDING):  aspirin  chewable 81 milliGRAM(s) Oral daily  atorvastatin 40 milliGRAM(s) Oral at bedtime  azithromycin  IVPB 500 milliGRAM(s) IV Intermittent every 24 hours  cefTRIAXone   IVPB 1000 milliGRAM(s) IV Intermittent every 24 hours  dextrose 5%. 1000 milliLiter(s) (100 mL/Hr) IV Continuous <Continuous>  dextrose 5%. 1000 milliLiter(s) (50 mL/Hr) IV Continuous <Continuous>  dextrose 50% Injectable 25 Gram(s) IV Push once  dextrose 50% Injectable 25 Gram(s) IV Push once  dextrose 50% Injectable 12.5 Gram(s) IV Push once  enoxaparin Injectable 40 milliGRAM(s) SubCutaneous every 24 hours  glucagon  Injectable 1 milliGRAM(s) IntraMuscular once  insulin lispro (ADMELOG) corrective regimen sliding scale   SubCutaneous Before meals and at bedtime  lactated ringers. 1000 milliLiter(s) (75 mL/Hr) IV Continuous <Continuous>  oseltamivir 75 milliGRAM(s) Oral two times a day  potassium chloride   Powder 40 milliEquivalent(s) Oral once    MEDICATIONS  (PRN):  acetaminophen     Tablet .. 650 milliGRAM(s) Oral every 6 hours PRN Temp greater or equal to 38C (100.4F), Mild Pain (1 - 3)  dextrose Oral Gel 15 Gram(s) Oral once PRN Blood Glucose LESS THAN 70 milliGRAM(s)/deciliter  ondansetron Injectable 4 milliGRAM(s) IV Push every 8 hours PRN Nausea and/or Vomiting      __________________________________________________  REVIEW OF SYSTEMS:    CONSTITUTIONAL: No fever,   EYES: no acute visual disturbances  NECK: No pain or stiffness  RESPIRATORY: No cough; No shortness of breath  CARDIOVASCULAR: No chest pain, no palpitations  GASTROINTESTINAL: No pain. No nausea or vomiting; No diarrhea   NEUROLOGICAL: No headache or numbness, no tremors  MUSCULOSKELETAL: No joint pain, no muscle pain  GENITOURINARY: no dysuria, no frequency, no hesitancy  PSYCHIATRY: no depression , no anxiety  ALL OTHER  ROS negative        Vital Signs Last 24 Hrs  T(C): 37.8 (05 Feb 2024 04:54), Max: 37.8 (05 Feb 2024 04:54)  T(F): 100.1 (05 Feb 2024 04:54), Max: 100.1 (05 Feb 2024 04:54)  HR: 76 (05 Feb 2024 04:54) (76 - 85)  BP: 130/55 (05 Feb 2024 04:54) (102/60 - 144/74)  BP(mean): 98 (04 Feb 2024 15:30) (98 - 98)  RR: 18 (05 Feb 2024 04:54) (18 - 20)  SpO2: 95% (05 Feb 2024 04:54) (94% - 95%)    Parameters below as of 05 Feb 2024 04:54  Patient On (Oxygen Delivery Method): nasal cannula  O2 Flow (L/min): 2      ________________________________________________  PHYSICAL EXAM:  GENERAL: NAD  HEENT: Normocephalic;  conjunctivae and sclerae clear; moist mucous membranes;   NECK : supple  CHEST/LUNG: Clear to auscultation bilaterally with good air entry   HEART: S1 S2  regular; no murmurs, gallops or rubs  ABDOMEN: Soft, Nontender, Nondistended; Bowel sounds present  EXTREMITIES: no cyanosis; no edema; no calf tenderness  SKIN: warm and dry; no rash  NERVOUS SYSTEM:  Awake and alert; Oriented  to place, person and time ; no new deficits    _________________________________________________  LABS:                        10.8   9.78  )-----------( 141      ( 05 Feb 2024 06:40 )             32.0     02-05    136  |  100  |  16  ----------------------------<  99  3.3<L>   |  29  |  0.95    Ca    8.1<L>      05 Feb 2024 06:40  Mg     1.4     02-04    TPro  6.4  /  Alb  2.7<L>  /  TBili  1.1  /  DBili  x   /  AST  21  /  ALT  18  /  AlkPhos  46  02-05    PT/INR - ( 05 Feb 2024 09:25 )   PT: 13.0 sec;   INR: 1.14 ratio         PTT - ( 05 Feb 2024 09:25 )  PTT:32.9 sec  Urinalysis Basic - ( 05 Feb 2024 06:40 )    Color: x / Appearance: x / SG: x / pH: x  Gluc: 99 mg/dL / Ketone: x  / Bili: x / Urobili: x   Blood: x / Protein: x / Nitrite: x   Leuk Esterase: x / RBC: x / WBC x   Sq Epi: x / Non Sq Epi: x / Bacteria: x      CAPILLARY BLOOD GLUCOSE      POCT Blood Glucose.: 97 mg/dL (05 Feb 2024 11:58)  POCT Blood Glucose.: 112 mg/dL (05 Feb 2024 07:43)  POCT Blood Glucose.: 126 mg/dL (04 Feb 2024 21:43)  POCT Blood Glucose.: 127 mg/dL (04 Feb 2024 17:24)        RADIOLOGY & ADDITIONAL TESTS:    Imaging Personally Reviewed:  YES/NO    Consultant(s) Notes Reviewed:   YES/ No    Care Discussed with Consultants :     Plan of care was discussed with patient and /or primary care giver; all questions and concerns were addressed and care was aligned with patient's wishes.

## 2024-02-05 NOTE — PROGRESS NOTE ADULT - SUBJECTIVE AND OBJECTIVE BOX
Date of Service  : 02-05-24    INTERVAL HPI/OVERNIGHT EVENTS: I feel better . Brother in room.  Vital Signs Last 24 Hrs  T(C): 37.8 (05 Feb 2024 04:54), Max: 37.8 (05 Feb 2024 04:54)  T(F): 100.1 (05 Feb 2024 04:54), Max: 100.1 (05 Feb 2024 04:54)  HR: 76 (05 Feb 2024 04:54) (76 - 85)  BP: 130/55 (05 Feb 2024 04:54) (102/60 - 144/74)  BP(mean): 98 (04 Feb 2024 15:30) (98 - 98)  RR: 18 (05 Feb 2024 04:54) (18 - 20)  SpO2: 95% (05 Feb 2024 04:54) (94% - 95%)    Parameters below as of 05 Feb 2024 04:54  Patient On (Oxygen Delivery Method): nasal cannula  O2 Flow (L/min): 2    I&O's Summary    MEDICATIONS  (STANDING):  aspirin  chewable 81 milliGRAM(s) Oral daily  atorvastatin 40 milliGRAM(s) Oral at bedtime  azithromycin  IVPB 500 milliGRAM(s) IV Intermittent every 24 hours  cefTRIAXone   IVPB 1000 milliGRAM(s) IV Intermittent every 24 hours  dextrose 5%. 1000 milliLiter(s) (50 mL/Hr) IV Continuous <Continuous>  dextrose 5%. 1000 milliLiter(s) (100 mL/Hr) IV Continuous <Continuous>  dextrose 50% Injectable 12.5 Gram(s) IV Push once  dextrose 50% Injectable 25 Gram(s) IV Push once  dextrose 50% Injectable 25 Gram(s) IV Push once  enoxaparin Injectable 40 milliGRAM(s) SubCutaneous every 24 hours  glucagon  Injectable 1 milliGRAM(s) IntraMuscular once  insulin lispro (ADMELOG) corrective regimen sliding scale   SubCutaneous Before meals and at bedtime  lactated ringers. 1000 milliLiter(s) (75 mL/Hr) IV Continuous <Continuous>  oseltamivir 75 milliGRAM(s) Oral two times a day    MEDICATIONS  (PRN):  acetaminophen     Tablet .. 650 milliGRAM(s) Oral every 6 hours PRN Temp greater or equal to 38C (100.4F), Mild Pain (1 - 3)  dextrose Oral Gel 15 Gram(s) Oral once PRN Blood Glucose LESS THAN 70 milliGRAM(s)/deciliter  ondansetron Injectable 4 milliGRAM(s) IV Push every 8 hours PRN Nausea and/or Vomiting    LABS:                        10.8   9.78  )-----------( 141      ( 05 Feb 2024 06:40 )             32.0     02-05    136  |  100  |  16  ----------------------------<  99  3.3<L>   |  29  |  0.95    Ca    8.1<L>      05 Feb 2024 06:40  Mg     1.4     02-04    TPro  6.4  /  Alb  2.7<L>  /  TBili  1.1  /  DBili  x   /  AST  21  /  ALT  18  /  AlkPhos  46  02-05    PT/INR - ( 05 Feb 2024 09:25 )   PT: 13.0 sec;   INR: 1.14 ratio         PTT - ( 05 Feb 2024 09:25 )  PTT:32.9 sec  Urinalysis Basic - ( 05 Feb 2024 06:40 )    Color: x / Appearance: x / SG: x / pH: x  Gluc: 99 mg/dL / Ketone: x  / Bili: x / Urobili: x   Blood: x / Protein: x / Nitrite: x   Leuk Esterase: x / RBC: x / WBC x   Sq Epi: x / Non Sq Epi: x / Bacteria: x      CAPILLARY BLOOD GLUCOSE      POCT Blood Glucose.: 112 mg/dL (05 Feb 2024 07:43)  POCT Blood Glucose.: 126 mg/dL (04 Feb 2024 21:43)  POCT Blood Glucose.: 127 mg/dL (04 Feb 2024 17:24)      ABG - ( 04 Feb 2024 11:20 )  pH, Arterial: 7.49  pH, Blood: x     /  pCO2: 36    /  pO2: 67    / HCO3: 27    / Base Excess: 4.1   /  SaO2: 95                Urinalysis Basic - ( 05 Feb 2024 06:40 )    Color: x / Appearance: x / SG: x / pH: x  Gluc: 99 mg/dL / Ketone: x  / Bili: x / Urobili: x   Blood: x / Protein: x / Nitrite: x   Leuk Esterase: x / RBC: x / WBC x   Sq Epi: x / Non Sq Epi: x / Bacteria: x      REVIEW OF SYSTEMS:  CONSTITUTIONAL: No fever, weight loss, or fatigue  EYES: No eye pain, visual disturbances, or discharge  ENMT:  No difficulty hearing, tinnitus, vertigo; No sinus or throat pain  NECK: No pain or stiffness  RESPIRATORY: No cough, wheezing, chills or hemoptysis; No shortness of breath  CARDIOVASCULAR: No chest pain, palpitations, dizziness, or leg swelling  GASTROINTESTINAL: No abdominal or epigastric pain. No nausea, vomiting, or hematemesis; No diarrhea or constipation. No melena or hematochezia.  GENITOURINARY: No dysuria, frequency, hematuria, or incontinence  NEUROLOGICAL: No headaches, memory loss, loss of strength, numbness, or tremors      Consultant(s) Notes Reviewed:  [x ] YES  [ ] NO    PHYSICAL EXAM:  GENERAL: NAD, well-groomed, well-developed,not in any distress ,NC oxygen   HEAD:  Atraumatic, Normocephalic  EYES: EOMI, PERRLA, conjunctiva and sclera clear  ENMT: No tonsillar erythema, exudates, or enlargement; Moist mucous membranes, Good dentition, No lesions  NECK: Supple, No JVD, Normal thyroid  NERVOUS SYSTEM:  Alert & Oriented X3, No focal deficit   CHEST/LUNG: Good air entry bilateral with no  rales, rhonchi, wheezing, or rubs  HEART: Regular rate and rhythm; No murmurs, rubs, or gallops  ABDOMEN: Soft, Nontender, Nondistended; Bowel sounds present  EXTREMITIES:  2+ Peripheral Pulses, No clubbing, cyanosis, or edema      Care Discussed with Consultants/Other Providers [ x] YES  [ ] NO

## 2024-02-05 NOTE — PHARMACOTHERAPY INTERVENTION NOTE - COMMENTS
Patient identified by [place] ROBERTA LIST for pneumonia. Pertinent medications were reviewed and no additional interventions at this time.

## 2024-02-05 NOTE — CONSULT NOTE ADULT - ASSESSMENT
Influenza A infection  Bilat Pneumonia  Fevers      Plan - Cont Tamiflu 75mgs po BID x 5 days  Cont Rocephin 1 gm iv q24hrs  Cont Azithromycin 500mgs iv q24hrs.

## 2024-02-05 NOTE — CONSULT NOTE ADULT - SUBJECTIVE AND OBJECTIVE BOX
HPI:  A 78 year old male, from home, with PMHx of HTN, DM, HLD, and Prostate Ca s/p resection presented to the ED complaining of dyspnea that started over 24 to 48 hours ago. This is associated with productive cough, pleuritic chest pain, fever, chills, and malaise. Patient has been having nausea, non bloody emesis, and headache for about a week. Denies abdominal pain, diarrhea, night sweats, weight loss, dizziness, or syncope. Denies orthopnea or leg swelling. No recent sick contact or travel. He does not have any other concerns.  (04 Feb 2024 12:07)      PAST MEDICAL & SURGICAL HISTORY:  Hypercholesterolemia      Hypertension      Prostate cancer      S/P prostatectomy          penicillin (Rash)      Meds:  acetaminophen     Tablet .. 650 milliGRAM(s) Oral every 6 hours PRN  aspirin  chewable 81 milliGRAM(s) Oral daily  atorvastatin 40 milliGRAM(s) Oral at bedtime  azithromycin  IVPB 500 milliGRAM(s) IV Intermittent every 24 hours  cefTRIAXone   IVPB 1000 milliGRAM(s) IV Intermittent every 24 hours  dextrose 5%. 1000 milliLiter(s) IV Continuous <Continuous>  dextrose 5%. 1000 milliLiter(s) IV Continuous <Continuous>  dextrose 50% Injectable 25 Gram(s) IV Push once  dextrose 50% Injectable 25 Gram(s) IV Push once  dextrose 50% Injectable 12.5 Gram(s) IV Push once  dextrose Oral Gel 15 Gram(s) Oral once PRN  enoxaparin Injectable 40 milliGRAM(s) SubCutaneous every 24 hours  glucagon  Injectable 1 milliGRAM(s) IntraMuscular once  insulin lispro (ADMELOG) corrective regimen sliding scale   SubCutaneous Before meals and at bedtime  lactated ringers. 1000 milliLiter(s) IV Continuous <Continuous>  ondansetron Injectable 4 milliGRAM(s) IV Push every 8 hours PRN  oseltamivir 75 milliGRAM(s) Oral two times a day      SOCIAL HISTORY:  Smoker:  YES / NO        PACK YEARS:                         WHEN QUIT?  ETOH use:  YES / NO               FREQUENCY / QUANTITY:  Ilicit Drug use:  YES / NO  Occupation:  Assisted device use (Cane / Walker):  Live with:    FAMILY HISTORY:  No pertinent family history        VITALS:  Vital Signs Last 24 Hrs  T(C): 37.1 (05 Feb 2024 14:31), Max: 37.8 (05 Feb 2024 04:54)  T(F): 98.8 (05 Feb 2024 14:31), Max: 100.1 (05 Feb 2024 04:54)  HR: 77 (05 Feb 2024 14:31) (76 - 79)  BP: 124/77 (05 Feb 2024 14:31) (110/53 - 130/55)  BP(mean): --  RR: 20 (05 Feb 2024 14:31) (18 - 20)  SpO2: 94% (05 Feb 2024 14:31) (94% - 95%)    Parameters below as of 05 Feb 2024 14:31  Patient On (Oxygen Delivery Method): nasal cannula  O2 Flow (L/min): 3      LABS/DIAGNOSTIC TESTS:                          10.8   9.78  )-----------( 141      ( 05 Feb 2024 06:40 )             32.0     WBC Count: 9.78 K/uL (02-05 @ 06:40)  WBC Count: 12.68 K/uL (02-04 @ 09:50)      02-05    136  |  100  |  16  ----------------------------<  99  3.3<L>   |  29  |  0.95    Ca    8.1<L>      05 Feb 2024 06:40  Mg     1.7     02-05    TPro  6.4  /  Alb  2.7<L>  /  TBili  1.1  /  DBili  x   /  AST  21  /  ALT  18  /  AlkPhos  46  02-05      Urine Microscopic-Add On (NC) (02.04.24 @ 09:50)   Red Blood Cell - Urine: 5 /HPF  Bacteria: Few /HPF  Squamous Epithelial Cells: Present  White Blood Cell - Urine: 2 /HPFUrinalysis (02.04.24 @ 09:50)   pH Urine: 6.5  Glucose Qualitative, Urine: Negative mg/dL  Blood, Urine: Moderate  Color: Yellow  Urine Appearance: Clear  Bilirubin: Negative  Ketone - Urine: Trace mg/dL  Specific Gravity: 1.020  Protein, Urine: 100 mg/dL  Urobilinogen: 1.0 mg/dL  Nitrite: Negative  Leukocyte Esterase Concentration: Negative    LIVER FUNCTIONS - ( 05 Feb 2024 06:40 )  Alb: 2.7 g/dL / Pro: 6.4 g/dL / ALK PHOS: 46 U/L / ALT: 18 U/L DA / AST: 21 U/L / GGT: x             PT/INR - ( 05 Feb 2024 09:25 )   PT: 13.0 sec;   INR: 1.14 ratio         PTT - ( 05 Feb 2024 09:25 )  PTT:32.9 sec    LACTATE:    ABG - ABG - ( 04 Feb 2024 11:20 )  pH, Arterial: 7.49  pH, Blood: x     /  pCO2: 36    /  pO2: 67    / HCO3: 27    / Base Excess: 4.1   /  SaO2: 95                  CULTURES:   Clean Catch Clean Catch (Midstream)  02-04 @ 09:50   <10,000 CFU/mL Normal Urogenital Yanna  --  --          RADIOLOGY:< from: CT Angio Chest PE Protocol w/ IV Cont (02.04.24 @ 16:54) >  ACC: [id number] EXAM:  CT ANGIO CHEST PULM ART WAWIC   ORDERED BY: BLAKE HERRERA     PROCEDURE DATE:  02/04/2024          INTERPRETATION:  EXAMINATION: CT ANGIO CHEST PULMONARY ARTERY    CLINICAL INDICATION: Dyspnea    TECHNIQUE: CTA of the chest was performed after the administration of 80   cc administered of Omnipaque 350, 20 cc discarded.  MIP images were   reconstructed.    COMPARISON: None.    FINDINGS:    PULMONARY EMBOLISM: Limited evaluation for segmental and subsegmental   pulmonary embolism. No main, left right, or lobar pulmonary embolism..    Question right middle lobe segmental/subsegmental filling defect (4, 197)    AIRWAYS AND LUNGS: The central tracheobronchial tree is patent.    Scattered consolidative and patchy opacities.    MEDIASTINUM AND PLEURA: Mildly enlarged mediastinal lymph nodes. The   visualized portion of the thyroid gland is unremarkable. There is no   pleural effusion. There is no pneumothorax.    HEART AND VESSELS: The heart is normal in size.   There are   atherosclerotic calcifications of the aorta and coronary arteries.  There   is no pericardial effusion.    UPPER ABDOMEN: Images of the upper abdomen demonstrate cholelithiasis.    BONES AND SOFT TISSUES: The bones are unremarkable.  The soft tissues are   unremarkable.      IMPRESSION:  Limited evaluation for segmental and subsegmental pulmonary embolism. No   main, left right, or lobar pulmonary embolism..  Question right middle   lobe segmental/subsegmental filling defect may be artifactual. Repeat   CTPA recommended for complete evaluation.    Scattered consolidative and patchy opacities may represent multifocal   pneumonia. Continued follow-up CT recommended.    --- End of Report ---            SOLIS BRYAN MD; Attending Radiologist  This document has been electronically signed. Feb 4 2024  5:40PM    < end of copied text >        ROS  [  ] UNABLE TO ELICIT               HPI:  A 78 year old male, from home, with PMHx of HTN, DM, HLD, and Prostate Ca s/p resection presented to the ED complaining of dyspnea that started over 24 to 48 hours ago. This is associated with productive cough, pleuritic chest pain, fever, chills, and malaise. Patient has been having nausea, non bloody emesis, and headache for about a week. Denies abdominal pain, diarrhea, night sweats, weight loss, dizziness, or syncope. Denies orthopnea or leg swelling. No recent sick contact or travel. He does not have any other concerns.  (04 Feb 2024 12:07)        History as above asked to see this patient who presents to the hospital with weakness , anorexia, diarrhea and coughing but denies any SOB, he has been having some fevers and  chills, his symptoms have been present for approx 3 days , he also states he had some nausea. He was found to have Influenza A and bilat Bacterial Pneumonia here. He has some low grade fevers but no Leukocytosis.        PAST MEDICAL & SURGICAL HISTORY:  Hypercholesterolemia      Hypertension      Prostate cancer      S/P prostatectomy          penicillin (Rash)      Meds:  acetaminophen     Tablet .. 650 milliGRAM(s) Oral every 6 hours PRN  aspirin  chewable 81 milliGRAM(s) Oral daily  atorvastatin 40 milliGRAM(s) Oral at bedtime  azithromycin  IVPB 500 milliGRAM(s) IV Intermittent every 24 hours  cefTRIAXone   IVPB 1000 milliGRAM(s) IV Intermittent every 24 hours  dextrose 5%. 1000 milliLiter(s) IV Continuous <Continuous>  dextrose 5%. 1000 milliLiter(s) IV Continuous <Continuous>  dextrose 50% Injectable 25 Gram(s) IV Push once  dextrose 50% Injectable 25 Gram(s) IV Push once  dextrose 50% Injectable 12.5 Gram(s) IV Push once  dextrose Oral Gel 15 Gram(s) Oral once PRN  enoxaparin Injectable 40 milliGRAM(s) SubCutaneous every 24 hours  glucagon  Injectable 1 milliGRAM(s) IntraMuscular once  insulin lispro (ADMELOG) corrective regimen sliding scale   SubCutaneous Before meals and at bedtime  lactated ringers. 1000 milliLiter(s) IV Continuous <Continuous>  ondansetron Injectable 4 milliGRAM(s) IV Push every 8 hours PRN  oseltamivir 75 milliGRAM(s) Oral two times a day      SOCIAL HISTORY:  Smoker:  ex smoker  ETOH use:  YES , has a drink a day  Ilicit Drug use:  no      FAMILY HISTORY:  No pertinent family history        VITALS:  Vital Signs Last 24 Hrs  T(C): 37.1 (05 Feb 2024 14:31), Max: 37.8 (05 Feb 2024 04:54)  T(F): 98.8 (05 Feb 2024 14:31), Max: 100.1 (05 Feb 2024 04:54)  HR: 77 (05 Feb 2024 14:31) (76 - 79)  BP: 124/77 (05 Feb 2024 14:31) (110/53 - 130/55)  BP(mean): --  RR: 20 (05 Feb 2024 14:31) (18 - 20)  SpO2: 94% (05 Feb 2024 14:31) (94% - 95%)    Parameters below as of 05 Feb 2024 14:31  Patient On (Oxygen Delivery Method): nasal cannula  O2 Flow (L/min): 3      LABS/DIAGNOSTIC TESTS:                          10.8   9.78  )-----------( 141      ( 05 Feb 2024 06:40 )             32.0     WBC Count: 9.78 K/uL (02-05 @ 06:40)  WBC Count: 12.68 K/uL (02-04 @ 09:50)      02-05    136  |  100  |  16  ----------------------------<  99  3.3<L>   |  29  |  0.95    Ca    8.1<L>      05 Feb 2024 06:40  Mg     1.7     02-05    TPro  6.4  /  Alb  2.7<L>  /  TBili  1.1  /  DBili  x   /  AST  21  /  ALT  18  /  AlkPhos  46  02-05      Urine Microscopic-Add On (NC) (02.04.24 @ 09:50)   Red Blood Cell - Urine: 5 /HPF  Bacteria: Few /HPF  Squamous Epithelial Cells: Present  White Blood Cell - Urine: 2 /HPFUrinalysis (02.04.24 @ 09:50)   pH Urine: 6.5  Glucose Qualitative, Urine: Negative mg/dL  Blood, Urine: Moderate  Color: Yellow  Urine Appearance: Clear  Bilirubin: Negative  Ketone - Urine: Trace mg/dL  Specific Gravity: 1.020  Protein, Urine: 100 mg/dL  Urobilinogen: 1.0 mg/dL  Nitrite: Negative  Leukocyte Esterase Concentration: Negative    LIVER FUNCTIONS - ( 05 Feb 2024 06:40 )  Alb: 2.7 g/dL / Pro: 6.4 g/dL / ALK PHOS: 46 U/L / ALT: 18 U/L DA / AST: 21 U/L / GGT: x             PT/INR - ( 05 Feb 2024 09:25 )   PT: 13.0 sec;   INR: 1.14 ratio         PTT - ( 05 Feb 2024 09:25 )  PTT:32.9 sec    LACTATE:    ABG - ABG - ( 04 Feb 2024 11:20 )  pH, Arterial: 7.49  pH, Blood: x     /  pCO2: 36    /  pO2: 67    / HCO3: 27    / Base Excess: 4.1   /  SaO2: 95                  CULTURES:   Clean Catch Clean Catch (Midstream)  02-04 @ 09:50   <10,000 CFU/mL Normal Urogenital Yanna  --  --          RADIOLOGY:< from: CT Angio Chest PE Protocol w/ IV Cont (02.04.24 @ 16:54) >  ACC: [id number] EXAM:  CT ANGIO CHEST PULM ART WAWIC   ORDERED BY: BLAKE HERRERA     PROCEDURE DATE:  02/04/2024          INTERPRETATION:  EXAMINATION: CT ANGIO CHEST PULMONARY ARTERY    CLINICAL INDICATION: Dyspnea    TECHNIQUE: CTA of the chest was performed after the administration of 80   cc administered of Omnipaque 350, 20 cc discarded.  MIP images were   reconstructed.    COMPARISON: None.    FINDINGS:    PULMONARY EMBOLISM: Limited evaluation for segmental and subsegmental   pulmonary embolism. No main, left right, or lobar pulmonary embolism..    Question right middle lobe segmental/subsegmental filling defect (4, 197)    AIRWAYS AND LUNGS: The central tracheobronchial tree is patent.    Scattered consolidative and patchy opacities.    MEDIASTINUM AND PLEURA: Mildly enlarged mediastinal lymph nodes. The   visualized portion of the thyroid gland is unremarkable. There is no   pleural effusion. There is no pneumothorax.    HEART AND VESSELS: The heart is normal in size.   There are   atherosclerotic calcifications of the aorta and coronary arteries.  There   is no pericardial effusion.    UPPER ABDOMEN: Images of the upper abdomen demonstrate cholelithiasis.    BONES AND SOFT TISSUES: The bones are unremarkable.  The soft tissues are   unremarkable.      IMPRESSION:  Limited evaluation for segmental and subsegmental pulmonary embolism. No   main, left right, or lobar pulmonary embolism..  Question right middle   lobe segmental/subsegmental filling defect may be artifactual. Repeat   CTPA recommended for complete evaluation.    Scattered consolidative and patchy opacities may represent multifocal   pneumonia. Continued follow-up CT recommended.    --- End of Report ---            SOLIS BRYAN MD; Attending Radiologist  This document has been electronically signed. Feb 4 2024  5:40PM    < end of copied text >        ROS  [  ] UNABLE TO ELICIT

## 2024-02-06 DIAGNOSIS — Z02.9 ENCOUNTER FOR ADMINISTRATIVE EXAMINATIONS, UNSPECIFIED: ICD-10-CM

## 2024-02-06 LAB
ANION GAP SERPL CALC-SCNC: 7 MMOL/L — SIGNIFICANT CHANGE UP (ref 5–17)
BUN SERPL-MCNC: 16 MG/DL — SIGNIFICANT CHANGE UP (ref 7–18)
CALCIUM SERPL-MCNC: 8.5 MG/DL — SIGNIFICANT CHANGE UP (ref 8.4–10.5)
CHLORIDE SERPL-SCNC: 105 MMOL/L — SIGNIFICANT CHANGE UP (ref 96–108)
CO2 SERPL-SCNC: 27 MMOL/L — SIGNIFICANT CHANGE UP (ref 22–31)
CREAT SERPL-MCNC: 0.83 MG/DL — SIGNIFICANT CHANGE UP (ref 0.5–1.3)
EGFR: 89 ML/MIN/1.73M2 — SIGNIFICANT CHANGE UP
GLUCOSE BLDC GLUCOMTR-MCNC: 101 MG/DL — HIGH (ref 70–99)
GLUCOSE BLDC GLUCOMTR-MCNC: 109 MG/DL — HIGH (ref 70–99)
GLUCOSE BLDC GLUCOMTR-MCNC: 131 MG/DL — HIGH (ref 70–99)
GLUCOSE BLDC GLUCOMTR-MCNC: 143 MG/DL — HIGH (ref 70–99)
GLUCOSE SERPL-MCNC: 102 MG/DL — HIGH (ref 70–99)
GRAM STN FLD: ABNORMAL
HCT VFR BLD CALC: 33.9 % — LOW (ref 39–50)
HGB BLD-MCNC: 11.3 G/DL — LOW (ref 13–17)
MCHC RBC-ENTMCNC: 30.1 PG — SIGNIFICANT CHANGE UP (ref 27–34)
MCHC RBC-ENTMCNC: 33.3 GM/DL — SIGNIFICANT CHANGE UP (ref 32–36)
MCV RBC AUTO: 90.4 FL — SIGNIFICANT CHANGE UP (ref 80–100)
NRBC # BLD: 0 /100 WBCS — SIGNIFICANT CHANGE UP (ref 0–0)
PLATELET # BLD AUTO: 138 K/UL — LOW (ref 150–400)
POTASSIUM SERPL-MCNC: 3.4 MMOL/L — LOW (ref 3.5–5.3)
POTASSIUM SERPL-SCNC: 3.4 MMOL/L — LOW (ref 3.5–5.3)
RBC # BLD: 3.75 M/UL — LOW (ref 4.2–5.8)
RBC # FLD: 13 % — SIGNIFICANT CHANGE UP (ref 10.3–14.5)
SODIUM SERPL-SCNC: 139 MMOL/L — SIGNIFICANT CHANGE UP (ref 135–145)
SPECIMEN SOURCE: SIGNIFICANT CHANGE UP
WBC # BLD: 7.47 K/UL — SIGNIFICANT CHANGE UP (ref 3.8–10.5)
WBC # FLD AUTO: 7.47 K/UL — SIGNIFICANT CHANGE UP (ref 3.8–10.5)

## 2024-02-06 RX ORDER — PANTOPRAZOLE SODIUM 20 MG/1
40 TABLET, DELAYED RELEASE ORAL
Refills: 0 | Status: DISCONTINUED | OUTPATIENT
Start: 2024-02-06 | End: 2024-02-07

## 2024-02-06 RX ORDER — AMLODIPINE BESYLATE 2.5 MG/1
10 TABLET ORAL DAILY
Refills: 0 | Status: DISCONTINUED | OUTPATIENT
Start: 2024-02-06 | End: 2024-02-07

## 2024-02-06 RX ORDER — POTASSIUM CHLORIDE 20 MEQ
40 PACKET (EA) ORAL ONCE
Refills: 0 | Status: COMPLETED | OUTPATIENT
Start: 2024-02-06 | End: 2024-02-06

## 2024-02-06 RX ADMIN — PANTOPRAZOLE SODIUM 40 MILLIGRAM(S): 20 TABLET, DELAYED RELEASE ORAL at 18:22

## 2024-02-06 RX ADMIN — ENOXAPARIN SODIUM 40 MILLIGRAM(S): 100 INJECTION SUBCUTANEOUS at 11:28

## 2024-02-06 RX ADMIN — ATORVASTATIN CALCIUM 40 MILLIGRAM(S): 80 TABLET, FILM COATED ORAL at 22:21

## 2024-02-06 RX ADMIN — Medication 81 MILLIGRAM(S): at 11:17

## 2024-02-06 RX ADMIN — Medication 75 MILLIGRAM(S): at 05:24

## 2024-02-06 RX ADMIN — AZITHROMYCIN 255 MILLIGRAM(S): 500 TABLET, FILM COATED ORAL at 11:28

## 2024-02-06 RX ADMIN — Medication 75 MILLIGRAM(S): at 18:22

## 2024-02-06 RX ADMIN — Medication 5 MILLIGRAM(S): at 22:22

## 2024-02-06 RX ADMIN — Medication 40 MILLIEQUIVALENT(S): at 08:57

## 2024-02-06 RX ADMIN — CEFTRIAXONE 100 MILLIGRAM(S): 500 INJECTION, POWDER, FOR SOLUTION INTRAMUSCULAR; INTRAVENOUS at 11:17

## 2024-02-06 RX ADMIN — AMLODIPINE BESYLATE 10 MILLIGRAM(S): 2.5 TABLET ORAL at 18:26

## 2024-02-06 NOTE — DIETITIAN INITIAL EVALUATION ADULT - PERTINENT LABORATORY DATA
02-06    139  |  105  |  16  ----------------------------<  102<H>  3.4<L>   |  27  |  0.83    Ca    8.5      06 Feb 2024 06:47  Mg     1.7     02-05    TPro  6.4  /  Alb  2.7<L>  /  TBili  1.1  /  DBili  x   /  AST  21  /  ALT  18  /  AlkPhos  46  02-05  POCT Blood Glucose.: 131 mg/dL (02-06-24 @ 12:01)  A1C with Estimated Average Glucose Result: 5.6 % (02-05-24 @ 06:40)

## 2024-02-06 NOTE — DIETITIAN INITIAL EVALUATION ADULT - ORAL INTAKE PTA/DIET HISTORY
visited patient in droplet isolation room for influenza. son at bedside. reports not consuming meals for 1 week PTA, adequate prior To that.

## 2024-02-06 NOTE — PROGRESS NOTE ADULT - PROBLEM SELECTOR PLAN 7
hypertensive meds held in the setting of sepsis  monitor BP and resume meds as necessary
home meds Amlodipine and Carvedilol   restart Amlodipine

## 2024-02-06 NOTE — PROGRESS NOTE ADULT - PROBLEM SELECTOR PLAN 1
improving  VSS  ID Dr. Díaz to evaluate
secondary to Influenza PNA and superimposed bacterial pneumonia  continue Tamiflu  continue azithro and rocephin  afebrile  SpO2 92% RA- will add OX as needed  F/U Mycoplasma  F/U Sputum culture  ID Dr. Díaz

## 2024-02-06 NOTE — PROGRESS NOTE ADULT - SUBJECTIVE AND OBJECTIVE BOX
NP Note discussed with  Primary Attending    Patient is a 78y old  Male who presents with a chief complaint of Sepsis (05 Feb 2024 17:47)      INTERVAL HPI/OVERNIGHT EVENTS: no new complaints    MEDICATIONS  (STANDING):  aspirin  chewable 81 milliGRAM(s) Oral daily  atorvastatin 40 milliGRAM(s) Oral at bedtime  azithromycin  IVPB 500 milliGRAM(s) IV Intermittent every 24 hours  cefTRIAXone   IVPB 1000 milliGRAM(s) IV Intermittent every 24 hours  dextrose 5%. 1000 milliLiter(s) (50 mL/Hr) IV Continuous <Continuous>  dextrose 5%. 1000 milliLiter(s) (100 mL/Hr) IV Continuous <Continuous>  dextrose 50% Injectable 25 Gram(s) IV Push once  dextrose 50% Injectable 12.5 Gram(s) IV Push once  dextrose 50% Injectable 25 Gram(s) IV Push once  enoxaparin Injectable 40 milliGRAM(s) SubCutaneous every 24 hours  glucagon  Injectable 1 milliGRAM(s) IntraMuscular once  insulin lispro (ADMELOG) corrective regimen sliding scale   SubCutaneous Before meals and at bedtime  lactated ringers. 1000 milliLiter(s) (75 mL/Hr) IV Continuous <Continuous>  oseltamivir 75 milliGRAM(s) Oral two times a day    MEDICATIONS  (PRN):  acetaminophen     Tablet .. 650 milliGRAM(s) Oral every 6 hours PRN Temp greater or equal to 38C (100.4F), Mild Pain (1 - 3)  dextrose Oral Gel 15 Gram(s) Oral once PRN Blood Glucose LESS THAN 70 milliGRAM(s)/deciliter  melatonin 5 milliGRAM(s) Oral at bedtime PRN Insomnia  ondansetron Injectable 4 milliGRAM(s) IV Push every 8 hours PRN Nausea and/or Vomiting      __________________________________________________  REVIEW OF SYSTEMS:    CONSTITUTIONAL: No fever,   EYES: no acute visual disturbances  NECK: No pain or stiffness  RESPIRATORY: No cough; No shortness of breath  CARDIOVASCULAR: No chest pain, no palpitations  GASTROINTESTINAL: No pain. No nausea or vomiting; No diarrhea   NEUROLOGICAL: No headache or numbness, no tremors  MUSCULOSKELETAL: No joint pain, no muscle pain  GENITOURINARY: no dysuria, no frequency, no hesitancy  PSYCHIATRY: no depression , no anxiety  ALL OTHER  ROS negative        Vital Signs Last 24 Hrs  T(C): 36.8 (06 Feb 2024 05:21), Max: 37.1 (05 Feb 2024 14:31)  T(F): 98.3 (06 Feb 2024 05:21), Max: 98.8 (05 Feb 2024 14:31)  HR: 70 (06 Feb 2024 05:21) (70 - 77)  BP: 133/75 (06 Feb 2024 05:21) (124/77 - 143/60)  BP(mean): --  RR: 22 (06 Feb 2024 05:21) (18 - 22)  SpO2: 96% (06 Feb 2024 05:21) (94% - 96%)    Parameters below as of 06 Feb 2024 05:21  Patient On (Oxygen Delivery Method): nasal cannula  O2 Flow (L/min): 3      ________________________________________________  PHYSICAL EXAM:  GENERAL: NAD  HEENT: Normocephalic;  conjunctivae and sclerae clear; moist mucous membranes;   NECK : supple  CHEST/LUNG: Clear to auscultation bilaterally with good air entry   HEART: S1 S2  regular; no murmurs, gallops or rubs  ABDOMEN: Soft, Nontender, Nondistended; Bowel sounds present  EXTREMITIES: no cyanosis; no edema; no calf tenderness  SKIN: warm and dry; no rash  NERVOUS SYSTEM:  Awake and alert; Oriented  to place, person and time ; no new deficits    _________________________________________________  LABS:                        11.3   7.47  )-----------( 138      ( 06 Feb 2024 06:47 )             33.9     02-06    139  |  105  |  16  ----------------------------<  102<H>  3.4<L>   |  27  |  0.83    Ca    8.5      06 Feb 2024 06:47  Mg     1.7     02-05    TPro  6.4  /  Alb  2.7<L>  /  TBili  1.1  /  DBili  x   /  AST  21  /  ALT  18  /  AlkPhos  46  02-05    PT/INR - ( 05 Feb 2024 09:25 )   PT: 13.0 sec;   INR: 1.14 ratio         PTT - ( 05 Feb 2024 09:25 )  PTT:32.9 sec  Urinalysis Basic - ( 06 Feb 2024 06:47 )    Color: x / Appearance: x / SG: x / pH: x  Gluc: 102 mg/dL / Ketone: x  / Bili: x / Urobili: x   Blood: x / Protein: x / Nitrite: x   Leuk Esterase: x / RBC: x / WBC x   Sq Epi: x / Non Sq Epi: x / Bacteria: x      CAPILLARY BLOOD GLUCOSE      POCT Blood Glucose.: 109 mg/dL (06 Feb 2024 07:58)  POCT Blood Glucose.: 92 mg/dL (05 Feb 2024 21:16)  POCT Blood Glucose.: 135 mg/dL (05 Feb 2024 16:34)  POCT Blood Glucose.: 97 mg/dL (05 Feb 2024 11:58)        RADIOLOGY & ADDITIONAL TESTS:    Imaging  Reviewed:  YES/NO    Consultant(s) Notes Reviewed:   YES/ No      Plan of care was discussed with patient and /or primary care giver; all questions and concerns were addressed  NP Note discussed with  Primary Attending    Patient is a 78y old  Male who presents with a chief complaint of Sepsis (05 Feb 2024 17:47)      INTERVAL HPI/OVERNIGHT EVENTS: no new complaints    MEDICATIONS  (STANDING):  aspirin  chewable 81 milliGRAM(s) Oral daily  atorvastatin 40 milliGRAM(s) Oral at bedtime  azithromycin  IVPB 500 milliGRAM(s) IV Intermittent every 24 hours  cefTRIAXone   IVPB 1000 milliGRAM(s) IV Intermittent every 24 hours  dextrose 5%. 1000 milliLiter(s) (50 mL/Hr) IV Continuous <Continuous>  dextrose 5%. 1000 milliLiter(s) (100 mL/Hr) IV Continuous <Continuous>  dextrose 50% Injectable 25 Gram(s) IV Push once  dextrose 50% Injectable 12.5 Gram(s) IV Push once  dextrose 50% Injectable 25 Gram(s) IV Push once  enoxaparin Injectable 40 milliGRAM(s) SubCutaneous every 24 hours  glucagon  Injectable 1 milliGRAM(s) IntraMuscular once  insulin lispro (ADMELOG) corrective regimen sliding scale   SubCutaneous Before meals and at bedtime  lactated ringers. 1000 milliLiter(s) (75 mL/Hr) IV Continuous <Continuous>  oseltamivir 75 milliGRAM(s) Oral two times a day    MEDICATIONS  (PRN):  acetaminophen     Tablet .. 650 milliGRAM(s) Oral every 6 hours PRN Temp greater or equal to 38C (100.4F), Mild Pain (1 - 3)  dextrose Oral Gel 15 Gram(s) Oral once PRN Blood Glucose LESS THAN 70 milliGRAM(s)/deciliter  melatonin 5 milliGRAM(s) Oral at bedtime PRN Insomnia  ondansetron Injectable 4 milliGRAM(s) IV Push every 8 hours PRN Nausea and/or Vomiting      __________________________________________________  REVIEW OF SYSTEMS:    CONSTITUTIONAL: No fever,   EYES: no acute visual disturbances  NECK: No pain or stiffness  RESPIRATORY: No cough; No shortness of breath  CARDIOVASCULAR: No chest pain, no palpitations  GASTROINTESTINAL: No pain. No nausea or vomiting; No diarrhea   NEUROLOGICAL: No headache or numbness, no tremors  MUSCULOSKELETAL: No joint pain, no muscle pain  GENITOURINARY: no dysuria, no frequency, no hesitancy  PSYCHIATRY: no depression , no anxiety  ALL OTHER  ROS negative        Vital Signs Last 24 Hrs  T(C): 36.8 (06 Feb 2024 05:21), Max: 37.1 (05 Feb 2024 14:31)  T(F): 98.3 (06 Feb 2024 05:21), Max: 98.8 (05 Feb 2024 14:31)  HR: 70 (06 Feb 2024 05:21) (70 - 77)  BP: 133/75 (06 Feb 2024 05:21) (124/77 - 143/60)  BP(mean): --  RR: 22 (06 Feb 2024 05:21) (18 - 22)  SpO2: 96% (06 Feb 2024 05:21) (94% - 96%)    Parameters below as of 06 Feb 2024 05:21  Patient On (Oxygen Delivery Method): nasal cannula  O2 Flow (L/min): 3      ________________________________________________  PHYSICAL EXAM:  GENERAL: NAD  HEENT: Normocephalic;  conjunctivae and sclerae clear; moist mucous membranes;   NECK : supple  CHEST/LUNG: Bibasilar crackles  HEART: S1 S2  regular; no murmurs, gallops or rubs  ABDOMEN: Soft, Nontender, Nondistended; Bowel sounds present  EXTREMITIES: no cyanosis; no edema; no calf tenderness  SKIN: warm and dry; no rash  NERVOUS SYSTEM:  Awake and alert; Oriented  to place, person and time ; no new deficits    _________________________________________________  LABS:                        11.3   7.47  )-----------( 138      ( 06 Feb 2024 06:47 )             33.9     02-06    139  |  105  |  16  ----------------------------<  102<H>  3.4<L>   |  27  |  0.83    Ca    8.5      06 Feb 2024 06:47  Mg     1.7     02-05    TPro  6.4  /  Alb  2.7<L>  /  TBili  1.1  /  DBili  x   /  AST  21  /  ALT  18  /  AlkPhos  46  02-05    PT/INR - ( 05 Feb 2024 09:25 )   PT: 13.0 sec;   INR: 1.14 ratio         PTT - ( 05 Feb 2024 09:25 )  PTT:32.9 sec  Urinalysis Basic - ( 06 Feb 2024 06:47 )    Color: x / Appearance: x / SG: x / pH: x  Gluc: 102 mg/dL / Ketone: x  / Bili: x / Urobili: x   Blood: x / Protein: x / Nitrite: x   Leuk Esterase: x / RBC: x / WBC x   Sq Epi: x / Non Sq Epi: x / Bacteria: x      CAPILLARY BLOOD GLUCOSE      POCT Blood Glucose.: 109 mg/dL (06 Feb 2024 07:58)  POCT Blood Glucose.: 92 mg/dL (05 Feb 2024 21:16)  POCT Blood Glucose.: 135 mg/dL (05 Feb 2024 16:34)  POCT Blood Glucose.: 97 mg/dL (05 Feb 2024 11:58)        RADIOLOGY & ADDITIONAL TESTS:  < from: CT Angio Chest PE Protocol w/ IV Cont (02.04.24 @ 16:54) >  ACC: [id number] EXAM:  CT ANGIO CHEST PULM [facility]   ORDERED BY: BLAKE HERRERA     PROCEDURE DATE:  02/04/2024          INTERPRETATION:  EXAMINATION: CT ANGIO CHEST PULMONARY ARTERY    CLINICAL INDICATION: Dyspnea    TECHNIQUE: CTA of the chest was performed after the administration of 80   cc administered of Omnipaque 350, 20 cc discarded.  MIP images were   reconstructed.    COMPARISON: None.    FINDINGS:    PULMONARY EMBOLISM: Limited evaluation for segmental and subsegmental   pulmonary embolism. No main, left right, or lobar pulmonary embolism..    Question right middle lobe segmental/subsegmental filling defect (4, 197)    AIRWAYS AND LUNGS: The central tracheobronchial tree is patent.    Scattered consolidative and patchy opacities.    MEDIASTINUM AND PLEURA: Mildly enlarged mediastinal lymph nodes. The   visualized portion of the thyroid gland is unremarkable. There is no   pleural effusion. There is no pneumothorax.    HEART AND VESSELS: The heart is normal in size.   There are   atherosclerotic calcifications of the aorta and coronary arteries.  There   is no pericardial effusion.    UPPER ABDOMEN: Images of the upper abdomen demonstrate cholelithiasis.    BONES AND SOFT TISSUES: The bones are unremarkable.  The soft tissues are   unremarkable.      IMPRESSION:  Limited evaluation for segmental and subsegmental pulmonary embolism. No   main, left right, or lobar pulmonary embolism..  Question right middle   lobe segmental/subsegmental filling defect may be artifactual. Repeat   CTPA recommended for complete evaluation.    Scattered consolidative and patchy opacities may represent multifocal   pneumonia. Continued follow-up CT recommended.    --- End of Report ---      < end of copied text >  < from: Xray Chest 1 View- PORTABLE-Urgent (Xray Chest 1 View- PORTABLE-Urgent .) (02.04.24 @ 10:16) >  ACC: [id number] EXAM:  XR CHEST PORTABLE URGENT 1V   ORDERED BY: BLAKE HERRERA     PROCEDURE DATE:  02/04/2024          INTERPRETATION:  Chest radiograph (one view)     CPT 38331    CLINICAL INFORMATION: Short of breath.    TECHNIQUE:  Single frontal view of the chest was obtained.    FINDINGS:  Prior study dated 2/12/2011 was available for review.    The lungs demonstrate patchy right lower lobe opacity suspicious for   pneumonia. No pleural effusion is seen. The heart and mediastinum appear   intact.      IMPRESSION: Patchy right lower lobe opacity noted suspicious for   pneumonia.    --- End of Report ---    < end of copied text >    Imaging  Reviewed:  YES    Consultant(s) Notes Reviewed:   YES      Plan of care was discussed with patient and /or primary care giver; all questions and concerns were addressed

## 2024-02-06 NOTE — PROGRESS NOTE ADULT - SUBJECTIVE AND OBJECTIVE BOX
Date of Service  : 02-06-24     INTERVAL HPI/OVERNIGHT EVENTS: I feel better.   Vital Signs Last 24 Hrs  T(C): 36.8 (06 Feb 2024 13:28), Max: 36.8 (05 Feb 2024 20:15)  T(F): 98.2 (06 Feb 2024 13:28), Max: 98.3 (06 Feb 2024 05:21)  HR: 74 (06 Feb 2024 13:28) (70 - 74)  BP: 117/61 (06 Feb 2024 13:28) (117/61 - 143/60)  BP(mean): --  RR: 20 (06 Feb 2024 13:28) (18 - 22)  SpO2: 97% (06 Feb 2024 13:28) (93% - 97%)    Parameters below as of 06 Feb 2024 13:28  Patient On (Oxygen Delivery Method): nasal cannula  O2 Flow (L/min): 2    I&O's Summary    MEDICATIONS  (STANDING):  amLODIPine   Tablet 10 milliGRAM(s) Oral daily  aspirin  chewable 81 milliGRAM(s) Oral daily  atorvastatin 40 milliGRAM(s) Oral at bedtime  azithromycin  IVPB 500 milliGRAM(s) IV Intermittent every 24 hours  cefTRIAXone   IVPB 1000 milliGRAM(s) IV Intermittent every 24 hours  dextrose 5%. 1000 milliLiter(s) (100 mL/Hr) IV Continuous <Continuous>  dextrose 5%. 1000 milliLiter(s) (50 mL/Hr) IV Continuous <Continuous>  dextrose 50% Injectable 25 Gram(s) IV Push once  dextrose 50% Injectable 12.5 Gram(s) IV Push once  dextrose 50% Injectable 25 Gram(s) IV Push once  enoxaparin Injectable 40 milliGRAM(s) SubCutaneous every 24 hours  glucagon  Injectable 1 milliGRAM(s) IntraMuscular once  insulin lispro (ADMELOG) corrective regimen sliding scale   SubCutaneous Before meals and at bedtime  lactated ringers. 1000 milliLiter(s) (75 mL/Hr) IV Continuous <Continuous>  oseltamivir 75 milliGRAM(s) Oral two times a day  pantoprazole    Tablet 40 milliGRAM(s) Oral before breakfast    MEDICATIONS  (PRN):  acetaminophen     Tablet .. 650 milliGRAM(s) Oral every 6 hours PRN Temp greater or equal to 38C (100.4F), Mild Pain (1 - 3)  dextrose Oral Gel 15 Gram(s) Oral once PRN Blood Glucose LESS THAN 70 milliGRAM(s)/deciliter  melatonin 5 milliGRAM(s) Oral at bedtime PRN Insomnia  ondansetron Injectable 4 milliGRAM(s) IV Push every 8 hours PRN Nausea and/or Vomiting    LABS:                        11.3   7.47  )-----------( 138      ( 06 Feb 2024 06:47 )             33.9     02-06    139  |  105  |  16  ----------------------------<  102<H>  3.4<L>   |  27  |  0.83    Ca    8.5      06 Feb 2024 06:47  Mg     1.7     02-05    TPro  6.4  /  Alb  2.7<L>  /  TBili  1.1  /  DBili  x   /  AST  21  /  ALT  18  /  AlkPhos  46  02-05    PT/INR - ( 05 Feb 2024 09:25 )   PT: 13.0 sec;   INR: 1.14 ratio         PTT - ( 05 Feb 2024 09:25 )  PTT:32.9 sec  Urinalysis Basic - ( 06 Feb 2024 06:47 )    Color: x / Appearance: x / SG: x / pH: x  Gluc: 102 mg/dL / Ketone: x  / Bili: x / Urobili: x   Blood: x / Protein: x / Nitrite: x   Leuk Esterase: x / RBC: x / WBC x   Sq Epi: x / Non Sq Epi: x / Bacteria: x      CAPILLARY BLOOD GLUCOSE      POCT Blood Glucose.: 131 mg/dL (06 Feb 2024 12:01)  POCT Blood Glucose.: 109 mg/dL (06 Feb 2024 07:58)  POCT Blood Glucose.: 92 mg/dL (05 Feb 2024 21:16)  POCT Blood Glucose.: 135 mg/dL (05 Feb 2024 16:34)        Urinalysis Basic - ( 06 Feb 2024 06:47 )    Color: x / Appearance: x / SG: x / pH: x  Gluc: 102 mg/dL / Ketone: x  / Bili: x / Urobili: x   Blood: x / Protein: x / Nitrite: x   Leuk Esterase: x / RBC: x / WBC x   Sq Epi: x / Non Sq Epi: x / Bacteria: x      REVIEW OF SYSTEMS:  CONSTITUTIONAL: No fever, weight loss, or fatigue  EYES: No eye pain, visual disturbances, or discharge  ENMT:  No difficulty hearing, tinnitus, vertigo; No sinus or throat pain  NECK: No pain or stiffness  RESPIRATORY: No cough, wheezing, chills or hemoptysis; No shortness of breath  CARDIOVASCULAR: No chest pain, palpitations, dizziness, or leg swelling  GASTROINTESTINAL: No abdominal or epigastric pain. No nausea, vomiting, or hematemesis; No diarrhea or constipation. No melena or hematochezia.  GENITOURINARY: No dysuria, frequency, hematuria, or incontinence  NEUROLOGICAL: No headaches, memory loss, loss of strength, numbness, or tremors      Consultant(s) Notes Reviewed:  [x ] YES  [ ] NO    PHYSICAL EXAM:  GENERAL: NAD, well-groomed, well-developed,not in any distress ,  HEAD:  Atraumatic, Normocephalic  EYES: EOMI, PERRLA, conjunctiva and sclera clear  ENMT: No tonsillar erythema, exudates, or enlargement; Moist mucous membranes, Good dentition, No lesions  NECK: Supple, No JVD, Normal thyroid  NERVOUS SYSTEM:  Alert & Oriented X3, No focal deficit   CHEST/LUNG: Good air entry bilateral with no  rales, rhonchi, wheezing, or rubs  HEART: Regular rate and rhythm; No murmurs, rubs, or gallops  ABDOMEN: Soft, Nontender, Nondistended; Bowel sounds present  EXTREMITIES:  2+ Peripheral Pulses, No clubbing, cyanosis, or edema    Care Discussed with Consultants/Other Providers [ x] YES  [ ] NO

## 2024-02-06 NOTE — DIETITIAN INITIAL EVALUATION ADULT - PERTINENT MEDS FT
MEDICATIONS  (STANDING):  aspirin  chewable 81 milliGRAM(s) Oral daily  atorvastatin 40 milliGRAM(s) Oral at bedtime  azithromycin  IVPB 500 milliGRAM(s) IV Intermittent every 24 hours  cefTRIAXone   IVPB 1000 milliGRAM(s) IV Intermittent every 24 hours  dextrose 5%. 1000 milliLiter(s) (50 mL/Hr) IV Continuous <Continuous>  dextrose 5%. 1000 milliLiter(s) (100 mL/Hr) IV Continuous <Continuous>  dextrose 50% Injectable 25 Gram(s) IV Push once  dextrose 50% Injectable 25 Gram(s) IV Push once  dextrose 50% Injectable 12.5 Gram(s) IV Push once  enoxaparin Injectable 40 milliGRAM(s) SubCutaneous every 24 hours  glucagon  Injectable 1 milliGRAM(s) IntraMuscular once  insulin lispro (ADMELOG) corrective regimen sliding scale   SubCutaneous Before meals and at bedtime  lactated ringers. 1000 milliLiter(s) (75 mL/Hr) IV Continuous <Continuous>  oseltamivir 75 milliGRAM(s) Oral two times a day    MEDICATIONS  (PRN):  acetaminophen     Tablet .. 650 milliGRAM(s) Oral every 6 hours PRN Temp greater or equal to 38C (100.4F), Mild Pain (1 - 3)  dextrose Oral Gel 15 Gram(s) Oral once PRN Blood Glucose LESS THAN 70 milliGRAM(s)/deciliter  melatonin 5 milliGRAM(s) Oral at bedtime PRN Insomnia  ondansetron Injectable 4 milliGRAM(s) IV Push every 8 hours PRN Nausea and/or Vomiting

## 2024-02-06 NOTE — SBIRT NOTE ADULT - NSSBIRTALCPOSREINDET_GEN_A_CORE
SW provided positive reinforcement regarding patient's current alcohol consumption. Patient admitted to  daily alcohol use of less than one cup a day. Patient understands the harms of excessive alcohol use. Patient does not view his alcohol consumption as problematic. Patient understands the harms of excessive alcohol use.

## 2024-02-07 ENCOUNTER — TRANSCRIPTION ENCOUNTER (OUTPATIENT)
Age: 79
End: 2024-02-07

## 2024-02-07 VITALS
OXYGEN SATURATION: 95 % | SYSTOLIC BLOOD PRESSURE: 156 MMHG | TEMPERATURE: 97 F | RESPIRATION RATE: 18 BRPM | HEART RATE: 80 BPM | DIASTOLIC BLOOD PRESSURE: 63 MMHG

## 2024-02-07 LAB
ALBUMIN SERPL ELPH-MCNC: 3.1 G/DL — LOW (ref 3.5–5)
ALP SERPL-CCNC: 58 U/L — SIGNIFICANT CHANGE UP (ref 40–120)
ALT FLD-CCNC: 33 U/L DA — SIGNIFICANT CHANGE UP (ref 10–60)
ANION GAP SERPL CALC-SCNC: 6 MMOL/L — SIGNIFICANT CHANGE UP (ref 5–17)
AST SERPL-CCNC: 22 U/L — SIGNIFICANT CHANGE UP (ref 10–40)
BILIRUB SERPL-MCNC: 0.5 MG/DL — SIGNIFICANT CHANGE UP (ref 0.2–1.2)
BUN SERPL-MCNC: 14 MG/DL — SIGNIFICANT CHANGE UP (ref 7–18)
CALCIUM SERPL-MCNC: 9.2 MG/DL — SIGNIFICANT CHANGE UP (ref 8.4–10.5)
CHLORIDE SERPL-SCNC: 105 MMOL/L — SIGNIFICANT CHANGE UP (ref 96–108)
CO2 SERPL-SCNC: 27 MMOL/L — SIGNIFICANT CHANGE UP (ref 22–31)
CREAT SERPL-MCNC: 0.83 MG/DL — SIGNIFICANT CHANGE UP (ref 0.5–1.3)
EGFR: 89 ML/MIN/1.73M2 — SIGNIFICANT CHANGE UP
GLUCOSE BLDC GLUCOMTR-MCNC: 101 MG/DL — HIGH (ref 70–99)
GLUCOSE BLDC GLUCOMTR-MCNC: 112 MG/DL — HIGH (ref 70–99)
GLUCOSE BLDC GLUCOMTR-MCNC: 97 MG/DL — SIGNIFICANT CHANGE UP (ref 70–99)
GLUCOSE SERPL-MCNC: 100 MG/DL — HIGH (ref 70–99)
HCT VFR BLD CALC: 36.2 % — LOW (ref 39–50)
HGB BLD-MCNC: 12.1 G/DL — LOW (ref 13–17)
M PNEUMO IGM SER-ACNC: 0.08 INDEX — SIGNIFICANT CHANGE UP (ref 0–0.9)
MAGNESIUM SERPL-MCNC: 2.1 MG/DL — SIGNIFICANT CHANGE UP (ref 1.6–2.6)
MCHC RBC-ENTMCNC: 29.7 PG — SIGNIFICANT CHANGE UP (ref 27–34)
MCHC RBC-ENTMCNC: 33.4 GM/DL — SIGNIFICANT CHANGE UP (ref 32–36)
MCV RBC AUTO: 88.7 FL — SIGNIFICANT CHANGE UP (ref 80–100)
MYCOPLASMA AG SPEC QL: NEGATIVE — SIGNIFICANT CHANGE UP
NRBC # BLD: 0 /100 WBCS — SIGNIFICANT CHANGE UP (ref 0–0)
PHOSPHATE SERPL-MCNC: 2.7 MG/DL — SIGNIFICANT CHANGE UP (ref 2.5–4.5)
PLATELET # BLD AUTO: 207 K/UL — SIGNIFICANT CHANGE UP (ref 150–400)
POTASSIUM SERPL-MCNC: 3.7 MMOL/L — SIGNIFICANT CHANGE UP (ref 3.5–5.3)
POTASSIUM SERPL-SCNC: 3.7 MMOL/L — SIGNIFICANT CHANGE UP (ref 3.5–5.3)
PROT SERPL-MCNC: 7.5 G/DL — SIGNIFICANT CHANGE UP (ref 6–8.3)
RBC # BLD: 4.08 M/UL — LOW (ref 4.2–5.8)
RBC # FLD: 13 % — SIGNIFICANT CHANGE UP (ref 10.3–14.5)
SODIUM SERPL-SCNC: 138 MMOL/L — SIGNIFICANT CHANGE UP (ref 135–145)
WBC # BLD: 5.48 K/UL — SIGNIFICANT CHANGE UP (ref 3.8–10.5)
WBC # FLD AUTO: 5.48 K/UL — SIGNIFICANT CHANGE UP (ref 3.8–10.5)

## 2024-02-07 PROCEDURE — 85025 COMPLETE CBC W/AUTO DIFF WBC: CPT

## 2024-02-07 PROCEDURE — 36415 COLL VENOUS BLD VENIPUNCTURE: CPT

## 2024-02-07 PROCEDURE — 87640 STAPH A DNA AMP PROBE: CPT

## 2024-02-07 PROCEDURE — 96372 THER/PROPH/DIAG INJ SC/IM: CPT

## 2024-02-07 PROCEDURE — 83036 HEMOGLOBIN GLYCOSYLATED A1C: CPT

## 2024-02-07 PROCEDURE — 83690 ASSAY OF LIPASE: CPT

## 2024-02-07 PROCEDURE — 71045 X-RAY EXAM CHEST 1 VIEW: CPT

## 2024-02-07 PROCEDURE — 87641 MR-STAPH DNA AMP PROBE: CPT

## 2024-02-07 PROCEDURE — 86900 BLOOD TYPING SEROLOGIC ABO: CPT

## 2024-02-07 PROCEDURE — 80053 COMPREHEN METABOLIC PANEL: CPT

## 2024-02-07 PROCEDURE — 87449 NOS EACH ORGANISM AG IA: CPT

## 2024-02-07 PROCEDURE — 96375 TX/PRO/DX INJ NEW DRUG ADDON: CPT

## 2024-02-07 PROCEDURE — 96376 TX/PRO/DX INJ SAME DRUG ADON: CPT

## 2024-02-07 PROCEDURE — 82803 BLOOD GASES ANY COMBINATION: CPT

## 2024-02-07 PROCEDURE — 85027 COMPLETE CBC AUTOMATED: CPT

## 2024-02-07 PROCEDURE — 83605 ASSAY OF LACTIC ACID: CPT

## 2024-02-07 PROCEDURE — 71275 CT ANGIOGRAPHY CHEST: CPT | Mod: MA

## 2024-02-07 PROCEDURE — 87637 SARSCOV2&INF A&B&RSV AMP PRB: CPT

## 2024-02-07 PROCEDURE — 86901 BLOOD TYPING SEROLOGIC RH(D): CPT

## 2024-02-07 PROCEDURE — 83735 ASSAY OF MAGNESIUM: CPT

## 2024-02-07 PROCEDURE — 82962 GLUCOSE BLOOD TEST: CPT

## 2024-02-07 PROCEDURE — 80048 BASIC METABOLIC PNL TOTAL CA: CPT

## 2024-02-07 PROCEDURE — 86738 MYCOPLASMA ANTIBODY: CPT

## 2024-02-07 PROCEDURE — 80061 LIPID PANEL: CPT

## 2024-02-07 PROCEDURE — 87899 AGENT NOS ASSAY W/OPTIC: CPT

## 2024-02-07 PROCEDURE — 84132 ASSAY OF SERUM POTASSIUM: CPT

## 2024-02-07 PROCEDURE — 96374 THER/PROPH/DIAG INJ IV PUSH: CPT

## 2024-02-07 PROCEDURE — 84100 ASSAY OF PHOSPHORUS: CPT

## 2024-02-07 PROCEDURE — 85730 THROMBOPLASTIN TIME PARTIAL: CPT

## 2024-02-07 PROCEDURE — 87086 URINE CULTURE/COLONY COUNT: CPT

## 2024-02-07 PROCEDURE — 84484 ASSAY OF TROPONIN QUANT: CPT

## 2024-02-07 PROCEDURE — 86850 RBC ANTIBODY SCREEN: CPT

## 2024-02-07 PROCEDURE — 82330 ASSAY OF CALCIUM: CPT

## 2024-02-07 PROCEDURE — 99285 EMERGENCY DEPT VISIT HI MDM: CPT | Mod: 25

## 2024-02-07 PROCEDURE — 87070 CULTURE OTHR SPECIMN AEROBIC: CPT

## 2024-02-07 PROCEDURE — 87077 CULTURE AEROBIC IDENTIFY: CPT

## 2024-02-07 PROCEDURE — 93005 ELECTROCARDIOGRAM TRACING: CPT

## 2024-02-07 PROCEDURE — 80307 DRUG TEST PRSMV CHEM ANLYZR: CPT

## 2024-02-07 PROCEDURE — 85610 PROTHROMBIN TIME: CPT

## 2024-02-07 PROCEDURE — 84295 ASSAY OF SERUM SODIUM: CPT

## 2024-02-07 PROCEDURE — 81001 URINALYSIS AUTO W/SCOPE: CPT

## 2024-02-07 RX ORDER — LACTULOSE 10 G/15ML
10 SOLUTION ORAL ONCE
Refills: 0 | Status: COMPLETED | OUTPATIENT
Start: 2024-02-07 | End: 2024-02-07

## 2024-02-07 RX ORDER — MULTIVIT WITH MIN/MFOLATE/K2 340-15/3 G
296 POWDER (GRAM) ORAL ONCE
Refills: 0 | Status: DISCONTINUED | OUTPATIENT
Start: 2024-02-07 | End: 2024-02-07

## 2024-02-07 RX ORDER — CEFUROXIME AXETIL 250 MG
1 TABLET ORAL
Qty: 12 | Refills: 0
Start: 2024-02-07 | End: 2024-02-12

## 2024-02-07 RX ORDER — CELECOXIB 200 MG/1
1 CAPSULE ORAL
Refills: 0 | DISCHARGE

## 2024-02-07 RX ADMIN — Medication 81 MILLIGRAM(S): at 11:37

## 2024-02-07 RX ADMIN — ENOXAPARIN SODIUM 40 MILLIGRAM(S): 100 INJECTION SUBCUTANEOUS at 12:04

## 2024-02-07 RX ADMIN — Medication 75 MILLIGRAM(S): at 17:31

## 2024-02-07 RX ADMIN — PANTOPRAZOLE SODIUM 40 MILLIGRAM(S): 20 TABLET, DELAYED RELEASE ORAL at 06:04

## 2024-02-07 RX ADMIN — AMLODIPINE BESYLATE 10 MILLIGRAM(S): 2.5 TABLET ORAL at 05:53

## 2024-02-07 RX ADMIN — AZITHROMYCIN 255 MILLIGRAM(S): 500 TABLET, FILM COATED ORAL at 12:10

## 2024-02-07 RX ADMIN — CEFTRIAXONE 100 MILLIGRAM(S): 500 INJECTION, POWDER, FOR SOLUTION INTRAMUSCULAR; INTRAVENOUS at 11:36

## 2024-02-07 RX ADMIN — Medication 75 MILLIGRAM(S): at 05:53

## 2024-02-07 NOTE — PROGRESS NOTE ADULT - SUBJECTIVE AND OBJECTIVE BOX
78y Male    Meds:  cefTRIAXone   IVPB 1000 milliGRAM(s) IV Intermittent every 24 hours  oseltamivir 75 milliGRAM(s) Oral two times a day    Allergies    penicillin (Rash)    Intolerances        VITALS:  Vital Signs Last 24 Hrs  T(C): 36.3 (07 Feb 2024 14:14), Max: 36.7 (06 Feb 2024 18:28)  T(F): 97.3 (07 Feb 2024 14:14), Max: 98.1 (06 Feb 2024 18:28)  HR: 80 (07 Feb 2024 14:14) (70 - 80)  BP: 156/63 (07 Feb 2024 14:14) (127/77 - 156/63)  BP(mean): 96 (07 Feb 2024 05:39) (96 - 96)  RR: 18 (07 Feb 2024 14:14) (18 - 19)  SpO2: 95% (07 Feb 2024 14:14) (92% - 95%)    Parameters below as of 07 Feb 2024 14:14  Patient On (Oxygen Delivery Method): room air        LABS/DIAGNOSTIC TESTS:                          12.1   5.48  )-----------( 207      ( 07 Feb 2024 07:30 )             36.2         02-07    138  |  105  |  14  ----------------------------<  100<H>  3.7   |  27  |  0.83    Ca    9.2      07 Feb 2024 07:30  Phos  2.7     02-07  Mg     2.1     02-07    TPro  7.5  /  Alb  3.1<L>  /  TBili  0.5  /  DBili  x   /  AST  22  /  ALT  33  /  AlkPhos  58  02-07      LIVER FUNCTIONS - ( 07 Feb 2024 07:30 )  Alb: 3.1 g/dL / Pro: 7.5 g/dL / ALK PHOS: 58 U/L / ALT: 33 U/L DA / AST: 22 U/L / GGT: x             CULTURES: .Sputum Sputum  02-06 @ 12:25 --  --    Numerous polymorphonuclear leukocytes per low power field  Few Squamous epithelial cells per low power field  Numerous Gram positive cocci in pairs per oil power field  Few Gram Positive Rods per oil power field  Rare Gram Negative Rods per oil power field      Clean Catch Clean Catch (Midstream)  02-04 @ 09:50   <10,000 CFU/mL Normal Urogenital Yanna  --  --            RADIOLOGY:      ROS:  [  ] UNABLE TO ELICIT 78y Male who is doing great , he is no longer SOB, his cough is dramatically better, no fevers , chills or diarrhea, eating well, no other complaints . Will be going home today.    Meds:  cefTRIAXone   IVPB 1000 milliGRAM(s) IV Intermittent every 24 hours  oseltamivir 75 milliGRAM(s) Oral two times a day    Allergies    penicillin (Rash)    Intolerances        VITALS:  Vital Signs Last 24 Hrs  T(C): 36.3 (07 Feb 2024 14:14), Max: 36.7 (06 Feb 2024 18:28)  T(F): 97.3 (07 Feb 2024 14:14), Max: 98.1 (06 Feb 2024 18:28)  HR: 80 (07 Feb 2024 14:14) (70 - 80)  BP: 156/63 (07 Feb 2024 14:14) (127/77 - 156/63)  BP(mean): 96 (07 Feb 2024 05:39) (96 - 96)  RR: 18 (07 Feb 2024 14:14) (18 - 19)  SpO2: 95% (07 Feb 2024 14:14) (92% - 95%)    Parameters below as of 07 Feb 2024 14:14  Patient On (Oxygen Delivery Method): room air        LABS/DIAGNOSTIC TESTS:                          12.1   5.48  )-----------( 207      ( 07 Feb 2024 07:30 )             36.2         02-07    138  |  105  |  14  ----------------------------<  100<H>  3.7   |  27  |  0.83    Ca    9.2      07 Feb 2024 07:30  Phos  2.7     02-07  Mg     2.1     02-07    TPro  7.5  /  Alb  3.1<L>  /  TBili  0.5  /  DBili  x   /  AST  22  /  ALT  33  /  AlkPhos  58  02-07      LIVER FUNCTIONS - ( 07 Feb 2024 07:30 )  Alb: 3.1 g/dL / Pro: 7.5 g/dL / ALK PHOS: 58 U/L / ALT: 33 U/L DA / AST: 22 U/L / GGT: x             CULTURES: .Sputum Sputum  02-06 @ 12:25 --  --    Numerous polymorphonuclear leukocytes per low power field  Few Squamous epithelial cells per low power field  Numerous Gram positive cocci in pairs per oil power field  Few Gram Positive Rods per oil power field  Rare Gram Negative Rods per oil power field      Clean Catch Clean Catch (Midstream)  02-04 @ 09:50   <10,000 CFU/mL Normal Urogenital Yanna  --  --            RADIOLOGY:      ROS:  [  ] UNABLE TO ELICIT

## 2024-02-07 NOTE — DISCHARGE NOTE PROVIDER - PROVIDER TOKENS
FREE:[LAST:[Jeremiah],FIRST:[Demian],PHONE:[(   )    -],FAX:[(   )    -]] FREE:[LAST:[Jeremiah],FIRST:[Demian],PHONE:[(   )    -],FAX:[(   )    -],SCHEDULEDAPPT:[02/16/2024],SCHEDULEDAPPTTIME:[08:40 AM],ESTABLISHEDPATIENT:[T]]

## 2024-02-07 NOTE — DISCHARGE NOTE NURSING/CASE MANAGEMENT/SOCIAL WORK - NSDCVIVACCINE_GEN_ALL_CORE_FT
Tdap; 12-Nov-2019 16:13; Deidre Lund (RN); [employer]; [id number] (Exp. Date: 22-Oct-2021); IntraMuscular; Deltoid Left.; 0.5 milliLiter(s); VIS (VIS Published: 09-May-2013, VIS Presented: 12-Nov-2019);

## 2024-02-07 NOTE — DISCHARGE NOTE PROVIDER - NSDCCPCAREPLAN_GEN_ALL_CORE_FT
PRINCIPAL DISCHARGE DIAGNOSIS  Diagnosis: Acute respiratory failure with hypoxia  Assessment and Plan of Treatment: You were treated for low oxygen levels and difficulty breathing due to Flu infection.  Please continue TamiFlu and Ceftin to complete infection treatment.  You do not need Oxygen, your were walked and oxygen was monitored that was 94% on room air, and had no shortness of breath.  Please follow up with your Primary Care Physician and Pulmonologist in 1 week for further medical management.      SECONDARY DISCHARGE DIAGNOSES  Diagnosis: Sepsis  Assessment and Plan of Treatment: You were treated for low oxygen levels and difficulty breathing due to Flu infection.  Please continue TamiFlu and Ceftin to complete infection treatment.  You do not need Oxygen, your were walked and oxygen was monitored that was 94% on room air, and had no shortness of breath.  Please seek immediate help if you exhibit from the problem site if:  -fever, chills, shivering  -rapid heart rate, difficulty breathing  -worsening redness, drainage, foul odor  Please follow up with your Primary Care Physician and Pulmonologist in 1 week for further medical management.    Diagnosis: Pneumonia  Assessment and Plan of Treatment: You were treated for low oxygen levels and difficulty breathing due to Flu infection.  Please continue TamiFlu and Ceftin to complete infection treatment.  You do not need Oxygen, your were walked and oxygen was monitored that was 94% on room air, and had no shortness of breath.  Please seek immediate help if you exhibit from the problem site if:  -fever, chills, shivering  -rapid heart rate, difficulty breathing  -worsening redness, drainage, foul odor  Please follow up with your Primary Care Physician and Pulmonologist in 1 week for further medical management.    Diagnosis: Hypokalemia  Assessment and Plan of Treatment: Please follow up in 1 week for repeat blood work as your Potassium levels were low and was replaced.      Diagnosis: Influenza A  Assessment and Plan of Treatment: You were treated for low oxygen levels and difficulty breathing due to Flu infection.  Please continue TamiFlu and Ceftin to complete infection treatment.  You do not need Oxygen, your were walked and oxygen was monitored that was 94% on room air, and had no shortness of breath.  Please seek immediate help if you exhibit from the problem site if:  -fever, chills, shivering  -rapid heart rate, difficulty breathing  -worsening redness, drainage, foul odor  Please follow up with your Primary Care Physician and Pulmonologist in 1 week for further medical management.

## 2024-02-07 NOTE — DISCHARGE NOTE PROVIDER - NSDCMRMEDTOKEN_GEN_ALL_CORE_FT
amLODIPine 10 mg oral tablet: 1 tab(s) orally once a day  aspirin 81 mg oral tablet: 1 tab(s) orally once a day  atorvastatin 40 mg oral tablet: 1 tab(s) orally once a day (at bedtime)  carvedilol 25 mg oral tablet: 1 tab(s) orally 2 times a day  cefuroxime 500 mg oral tablet: 1 tab(s) orally 2 times a day  esomeprazole 40 mg oral delayed release capsule: 1 cap(s) orally once a day  losartan-hydroCHLOROthiazide 100 mg-25 mg oral tablet: 1 tab(s) orally once a day  metFORMIN 500 mg oral tablet, extended release: 1 tab(s) orally once a day with dinner  oseltamivir 75 mg oral capsule: 1 cap(s) orally 2 times a day  Systane Complete Preservative Free Dry Eye Relief ophthalmic solution: 1 drop(s) in each eye 2 times a day  Trulicity Pen 1.5 mg/0.5 mL subcutaneous solution: 1.5 milligram(s) subcutaneously once a week

## 2024-02-07 NOTE — PROGRESS NOTE ADULT - ASSESSMENT
78 year old male, from home, with PMHx of HTN, DM, HLD, and Prostate Ca s/p resection presented to the ED complaining of dyspnea associated with productive cough, pleuritic chest pain, fever, chills, and malaisethat started over 24 to 48 hours ago. Admitted to medicine for sepsis and AHRF likely secondary to PNA and/or Influenza A.        Problem/Plan - 1:  ·  Problem: Sepsis.   ·  Plan: Hemodynamically stable .    Resolving.   s/p 1L Bolus NS  1L Bolus LR   LR 75 cc for 24 hours   Lactate 2.6>2.0  Influenza positive   CXR noted  F/U Blood and Sputum cultures  Will start Ceftriaxone 1g IV and Azithromycin 500 mg IV   F/U Strep  F/U Mycoplasma  F/U Legionella  Tylenol PRN.     Problem/Plan - 2:  ·  Problem: Acute respiratory failure with hypoxia.   ·  Plan: Sec to pneumonia .   CXR noted  ABG noted  Will start Ceftriaxone 1g IV and Azithromycin 500 mg IV   Guaifenesin PRN for cough  F/U Strep  F/U Mycoplasma  F/U Legionella  F/U Sputum culture   Tylenol PRN  Wean oxygen as tolerated   Incentive spirometer.     Problem/Plan - 3:  ·  Problem: Pneumonia.   ·  Plan: Cant R/O Bacterial infection.   Will continue  Ceftriaxone 1g IV and Azithromycin 500 mg IV   Guaifenesin PRN for cough  F/U Strep  F/U Mycoplasma  F/U Legionella  F/U Sputum culture   Tylenol PRN.     Problem/Plan - 4:  ·  Problem: Influenza A.   ·  Plan: Positive for Influenza A  Isolation precautions.  Tamiflu 75 mg twice daily for 5 days   Guaifenesin PRN for cough  Tylenol PRN.     Problem/Plan - 5:  ·  Problem: DM (diabetes mellitus).   ·  Plan: Hold Metformin   Will start ISS  Finger stick before meal and bedtime   F/U HA1c  Diabetic diet.     Problem/Plan - 6:  ·  Problem: HTN (hypertension).   ·  Plan: Patient septic   Will monitor for now  Morning team to restart home medications once sepsis resolves   Currently at goal   BP target <130/90 mmHg  DASH/TLC diet  Adjust medications as needed to meet target.     Problem/Plan - 7:  ·  Problem: HLD (hyperlipidemia).   ·  Plan: Will continue high intensity statin.   F/U Lipid panel  DASH/TLC diet     Problem/Plan - 8:  ·  Problem: Prophylactic measure.   ·  Plan: Lovenox 40 mg SQ q24hr.  
78 year old male, from home, with PMHx of HTN, DM, HLD, and Prostate Ca s/p resection presented to the ED complaining of dyspnea associated with productive cough, pleuritic chest pain, fever, chills, and malaisethat started over 24 to 48 hours ago. Admitted to medicine for sepsis and AHRF likely secondary to PNA and/or Influenza A.   
78 year old male, from home, with PMHx of HTN, DM, HLD, and Prostate Ca s/p resection presented to the ED complaining of dyspnea associated with productive cough, pleuritic chest pain, fever, chills, and malaisethat started over 24 to 48 hours ago. Admitted to medicine for sepsis and AHRF likely secondary to PNA and/or Influenza A.        Problem/Plan - 1:  ·  Problem: Sepsis.   ·  Plan: Hemodynamically stable .    Resolving.   s/p 1L Bolus NS  1L Bolus LR   LR 75 cc for 24 hours   Lactate 2.6>2.0  Influenza positive   CXR noted  F/U Blood and Sputum cultures  Will start Ceftriaxone 1g IV and Azithromycin 500 mg IV   F/U Strep  F/U Mycoplasma  F/U Legionella  Tylenol PRN.     Problem/Plan - 2:  ·  Problem: Acute respiratory failure with hypoxia.   ·  Plan: Sec to pneumonia .   CXR noted  ABG noted  Will start Ceftriaxone 1g IV and Azithromycin 500 mg IV   Guaifenesin PRN for cough  F/U Strep  F/U Mycoplasma  F/U Legionella  F/U Sputum culture   Tylenol PRN  Wean oxygen as tolerated   Incentive spirometer.     Problem/Plan - 3:  ·  Problem: Pneumonia.   ·  Plan: Cant R/O Bacterial infection.   Will start Ceftriaxone 1g IV and Azithromycin 500 mg IV   Guaifenesin PRN for cough  F/U Strep  F/U Mycoplasma  F/U Legionella  F/U Sputum culture   Tylenol PRN.     Problem/Plan - 4:  ·  Problem: Influenza A.   ·  Plan: Positive for Influenza A  Isolation precautions.  Tamiflu 75 mg twice daily for 5 days   Guaifenesin PRN for cough  Tylenol PRN.     Problem/Plan - 5:  ·  Problem: DM (diabetes mellitus).   ·  Plan: Hold Metformin   Will start ISS  Finger stick before meal and bedtime   F/U HA1c  Diabetic diet.     Problem/Plan - 6:  ·  Problem: HTN (hypertension).   ·  Plan: Patient septic   Will monitor for now  Morning team to restart home medications once sepsis resolves   Currently at goal   BP target <130/90 mmHg  DASH/TLC diet  Adjust medications as needed to meet target.     Problem/Plan - 7:  ·  Problem: HLD (hyperlipidemia).   ·  Plan: Will continue high intensity statin.   F/U Lipid panel  DASH/TLC diet     Problem/Plan - 8:  ·  Problem: Prophylactic measure.   ·  Plan: Lovenox 40 mg SQ q24hr.    
78 year old male, from home, with PMHx of HTN, DM, HLD, and Prostate Ca s/p resection presented to the ED complaining of dyspnea associated with productive cough, pleuritic chest pain, fever, chills, and malaisethat started over 24 to 48 hours ago. Admitted to medicine for sepsis and AHRF likely secondary to PNA and/or Influenza A. ID consulted.  
Influenza A infection  Bilat Pneumonia  Fevers - resolved      Plan - Cont Tamiflu 75mgs po BID to complete a total of 5 days.  Switch  Rocephin to Ceftin 500mgs po bid x 5 days  DC Azithromycin  DC home today.  Reconsult prn.

## 2024-02-07 NOTE — DISCHARGE NOTE PROVIDER - CARE PROVIDER_API CALL
Demian Daniels  Phone: (   )    -  Fax: (   )    -  Follow Up Time:    Demian Daniels  Phone: (   )    -  Fax: (   )    -  Established Patient  Scheduled Appointment: 02/16/2024 08:40 AM

## 2024-02-07 NOTE — DISCHARGE NOTE PROVIDER - HOSPITAL COURSE
78 year old male, from home, with PMHx of HTN, DM, HLD, and Prostate Ca s/p resection presented to the ED complaining of dyspnea associated with productive cough, pleuritic chest pain, fever, chills, and malaisethat started over 24 to 48 hours ago. Admitted to medicine for sepsis and AHRF likely secondary to PNA and/or Influenza A. ID consulted.      ·  Problem: Acute respiratory failure with hypoxia, Sepsis,   ·  Plan: secondary to Influenza A PNA and superimposed bacterial pneumonia   Tamiflu x5 days total   azithro x3days  rocephin --> ceftin 6more days  SPO@ 94% RA on ambulation, no SOB  ID Dr. Nunez  Patient decline outpatient appointment with Pulmonology. Patient will follow up with his own physician with the guidance of his family. Spoke in full detail with Daughter in law Rodriguez.       ·  Problem: Hypokalemia.   ·  Plan: serum K 3.3  monitor lab and replete prn.    Please note that this a brief summary of hospital course please refer to daily progress notes and consult notes for full course and events. Patient seen and examined at bedside, discussed with medical attending. Patient medically cleared for discharge to home with outpatient follow up with Pulmonology.

## 2024-02-07 NOTE — GOALS OF CARE CONVERSATION - ADVANCED CARE PLANNING - CONVERSATION DETAILS
Spoke in full detail with patient regarding life sustaining treatment in the event of cardiac and respiratory arrest. Patient A+OX3, verbalizes that he does not want to be kept alive artificially in the event of cardiac arrest. Patient would opt for a trial of intubation in the event of respiratory arrest. Patient does not want a long term feeding tube. Patient verbalizes that he does not want to make final decision without discussing life sustaining treatment with his family first. Patient is a FULL Code.

## 2024-02-08 ENCOUNTER — TRANSCRIPTION ENCOUNTER (OUTPATIENT)
Age: 79
End: 2024-02-08

## 2024-02-08 LAB
CULTURE RESULTS: ABNORMAL
SPECIMEN SOURCE: SIGNIFICANT CHANGE UP

## 2024-02-09 ENCOUNTER — TRANSCRIPTION ENCOUNTER (OUTPATIENT)
Age: 79
End: 2024-02-09

## 2024-02-13 ENCOUNTER — APPOINTMENT (OUTPATIENT)
Age: 79
End: 2024-02-13
Payer: MEDICARE

## 2024-02-13 VITALS
HEART RATE: 71 BPM | RESPIRATION RATE: 17 BRPM | TEMPERATURE: 96.7 F | DIASTOLIC BLOOD PRESSURE: 72 MMHG | SYSTOLIC BLOOD PRESSURE: 136 MMHG | OXYGEN SATURATION: 99 %

## 2024-02-13 DIAGNOSIS — Z82.49 FAMILY HISTORY OF ISCHEMIC HEART DISEASE AND OTHER DISEASES OF THE CIRCULATORY SYSTEM: ICD-10-CM

## 2024-02-13 DIAGNOSIS — Z86.39 PERSONAL HISTORY OF OTHER ENDOCRINE, NUTRITIONAL AND METABOLIC DISEASE: ICD-10-CM

## 2024-02-13 DIAGNOSIS — I10 ESSENTIAL (PRIMARY) HYPERTENSION: ICD-10-CM

## 2024-02-13 DIAGNOSIS — Z82.3 FAMILY HISTORY OF STROKE: ICD-10-CM

## 2024-02-13 DIAGNOSIS — J09.X1 INFLUENZA DUE TO IDENTIFIED NOVEL INFLUENZA A VIRUS WITH PNEUMONIA: ICD-10-CM

## 2024-02-13 DIAGNOSIS — Z85.46 PERSONAL HISTORY OF MALIGNANT NEOPLASM OF PROSTATE: ICD-10-CM

## 2024-02-13 PROCEDURE — 99348 HOME/RES VST EST LOW MDM 30: CPT

## 2024-02-13 RX ORDER — ATORVASTATIN CALCIUM 40 MG/1
40 TABLET, FILM COATED ORAL
Qty: 30 | Refills: 0 | Status: ACTIVE | COMMUNITY

## 2024-02-13 RX ORDER — AMLODIPINE BESYLATE 10 MG/1
10 TABLET ORAL DAILY
Refills: 0 | Status: ACTIVE | COMMUNITY

## 2024-02-13 RX ORDER — CARVEDILOL 25 MG/1
25 TABLET, FILM COATED ORAL TWICE DAILY
Qty: 60 | Refills: 0 | Status: ACTIVE | COMMUNITY

## 2024-02-13 RX ORDER — ESOMEPRAZOLE MAGNESIUM 40 MG/1
40 CAPSULE, DELAYED RELEASE ORAL DAILY
Refills: 0 | Status: ACTIVE | COMMUNITY

## 2024-02-13 RX ORDER — LOSARTAN POTASSIUM AND HYDROCHLOROTHIAZIDE 25; 100 MG/1; MG/1
100-25 TABLET ORAL DAILY
Qty: 90 | Refills: 0 | Status: ACTIVE | COMMUNITY

## 2024-02-13 RX ORDER — CEFUROXIME AXETIL 500 MG/1
500 TABLET ORAL
Refills: 0 | Status: ACTIVE | COMMUNITY

## 2024-02-13 RX ORDER — DULAGLUTIDE 1.5 MG/.5ML
1.5 INJECTION, SOLUTION SUBCUTANEOUS
Refills: 0 | Status: ACTIVE | COMMUNITY

## 2024-02-13 NOTE — PLAN
[FreeTextEntry1] : -include probiotic daily -follow up with PCP on 2/26/24 -Confirmed patient has NP's contact information -Patient/family was informed about NP's role/ STARS program and overview of transitional care reviewed with patient. Patient/family educated on topics of importance such as compliance with all provider visits, prescribed medication regimen, and low salt / heart healthy diet. Patient/Family encouraged calling NP with any issues, concerns or questions, also educated to notify NP if experiencing CP, SOB , cough, increased mucus/phlegm production, abdominal discomfort/swelling, difficulty sleeping or lying flat, fever, chills, fatigue, weight gain of 2-3lbs in 24 hours or 5lbs in one week, dizziness, lightheadedness, n/v/d/c, swelling to extremities and/or any c/o or concerns. Reassurance provided.

## 2024-02-13 NOTE — HEALTH RISK ASSESSMENT
[No] : No [No falls in past year] : Patient reported no falls in the past year [Patient refused screening] : Patient refused screening [Diabetic Diet] : diabetic [Change in mental status noted] : No change in mental status noted [Alone] : lives alone [] :  [# Of Children ___] : has [unfilled] children [Fully functional (bathing, dressing, toileting, transferring, walking, feeding)] : Fully functional (bathing, dressing, toileting, transferring, walking, feeding) [Reports changes in hearing] : Reports no changes in hearing [Fully functional (using the telephone, shopping, preparing meals, housekeeping, doing laundry, using] : Fully functional and needs no help or supervision to perform IADLs (using the telephone, shopping, preparing meals, housekeeping, doing laundry, using transportation, managing medications and managing finances) [Reports changes in vision] : Reports no changes in vision [Reports changes in dental health] : Reports no changes in dental health [Former] : Former

## 2024-02-13 NOTE — PHYSICAL EXAM
[No Acute Distress] : no acute distress [Well-Appearing] : well-appearing [Normal Voice/Communication] : normal voice/communication [Normal Sclera/Conjunctiva] : normal sclera/conjunctiva [PERRL] : pupils equal round and reactive to light [EOMI] : extraocular movements intact [No JVD] : no jugular venous distention [No Respiratory Distress] : no respiratory distress  [Clear to Auscultation] : lungs were clear to auscultation bilaterally [No Accessory Muscle Use] : no accessory muscle use [Normal Rate] : normal rate  [Regular Rhythm] : with a regular rhythm [Normal S1, S2] : normal S1 and S2 [Pedal Pulses Present] : the pedal pulses are present [No Edema] : there was no peripheral edema [No Extremity Clubbing/Cyanosis] : no extremity clubbing/cyanosis [Soft] : abdomen soft [Non Tender] : non-tender [Non-distended] : non-distended [Normal Bowel Sounds] : normal bowel sounds [No Joint Swelling] : no joint swelling [No Rash] : no rash [Normal Affect] : the affect was normal [Normal Insight/Judgement] : insight and judgment were intact [Right Foot Was Examined] : Right foot ~C was examined [Left Foot Was Examined] : left foot ~C was examined [None] : no ulcers in either foot were found [] : normal

## 2024-02-13 NOTE — HISTORY OF PRESENT ILLNESS
[Post-hospitalization from ___ Hospital] : Post-hospitalization from [unfilled] Hospital [Admitted on: ___] : The patient was admitted on [unfilled] [Discharged on ___] : discharged on [unfilled] [Discharge Summary] : discharge summary [Pertinent Labs] : pertinent labs [Radiology Findings] : radiology findings [Discharge Med List] : discharge medication list [Med Reconciliation] : medication reconciliation has been completed [Patient Contacted By: ____] : and contacted by [unfilled] [FreeTextEntry2] : Mr. Smith is a 77 y/o man with pmhx of HTN, T2DM, HLD, Prostate CA s/p resection, Appendectomy and Right knee surgery who presented to [facility] with dyspnea, fever/chills and malaise. Admitted and treated for sepsis secondary to influenza A and superimposed bacterial pneumonia. Declined home care referral. Discharged home with the continuation of oseltamivir and cefuroxime.   Mr. Smith was seen in the home today. He was alone during the entire home visit. Currently, he reports that he has been doing well since discharge. Denies increased cough, shortness of breath, chest pain, decreased PO intake, and/or weakness. He reports an increase in bowel movements and denies abdominal cramping and diarrhea. He remains with 2 more tablets of cefuroxime; and completed oseltamivir. He had bloodwork and urine taken yesterday for his scheduled appointment with PCP, Dr. Daniels on 2/16/24.     [FreeTextEntry3] : [facility] Hospitalization Follow up

## 2024-02-21 ENCOUNTER — TRANSCRIPTION ENCOUNTER (OUTPATIENT)
Age: 79
End: 2024-02-21

## 2024-02-28 ENCOUNTER — TRANSCRIPTION ENCOUNTER (OUTPATIENT)
Age: 79
End: 2024-02-28

## 2024-09-30 NOTE — ED PROVIDER NOTE - EYES, MLM
Goal Outcome Evaluation:  Plan of Care Reviewed With: patient        Progress: no change  Outcome Evaluation: Pt has been AOX4. Pt is [place] and hearing aids at bedside. Pt has been up to the side of the bed to use urinal. Pt BP has been low after giving him is home dose of Coreg around midnight. Pt really is not tolerating that dose. [facility] APRN aware of the low BP this am. Pt states that he has these same symptoms at home. Hopefully PT will be able to work with him. Cardiology orderd a Echo hopefully completed today. Consult to Nephrology. EEG wnl. MRI of Brain could not be completed. Pt rested well.  CTM, safety maintained.                                Clear bilaterally, pupils equal, round and reactive to light.

## 2024-11-20 NOTE — DISCHARGE NOTE PROVIDER - NSDCHC_MEDRECSTATUS_GEN_ALL_CORE
Occupational Therapy    Patient not seen in therapy.     On hold due to medical condition    Orders received and chart reviewed. PTT is 74 and pt has an ultrasound scheduled to rule out DVT. Will hold evaluation pending lab values and ultrasound results. Will re-attempt when medically ready.       OBJECTIVE                             Therapy procedure time and total treatment time can be found documented on the Time Entry flowsheet   Admission Reconciliation is Completed  Discharge Reconciliation is Completed